# Patient Record
Sex: FEMALE | Race: WHITE | NOT HISPANIC OR LATINO | Employment: OTHER | URBAN - METROPOLITAN AREA
[De-identification: names, ages, dates, MRNs, and addresses within clinical notes are randomized per-mention and may not be internally consistent; named-entity substitution may affect disease eponyms.]

---

## 2024-06-02 ENCOUNTER — APPOINTMENT (OUTPATIENT)
Dept: RADIOLOGY | Facility: HOSPITAL | Age: 84
End: 2024-06-02
Payer: MEDICARE

## 2024-06-02 ENCOUNTER — HOSPITAL ENCOUNTER (EMERGENCY)
Facility: HOSPITAL | Age: 84
Discharge: HOME | End: 2024-06-02
Attending: EMERGENCY MEDICINE
Payer: MEDICARE

## 2024-06-02 VITALS
HEART RATE: 61 BPM | RESPIRATION RATE: 15 BRPM | SYSTOLIC BLOOD PRESSURE: 157 MMHG | TEMPERATURE: 98.1 F | WEIGHT: 200 LBS | DIASTOLIC BLOOD PRESSURE: 68 MMHG | OXYGEN SATURATION: 99 % | HEIGHT: 65 IN | BODY MASS INDEX: 33.32 KG/M2

## 2024-06-02 DIAGNOSIS — S09.90XA HEAD INJURY, INITIAL ENCOUNTER: Primary | ICD-10-CM

## 2024-06-02 PROCEDURE — 72125 CT NECK SPINE W/O DYE: CPT

## 2024-06-02 PROCEDURE — 70450 CT HEAD/BRAIN W/O DYE: CPT

## 2024-06-02 PROCEDURE — G0390 TRAUMA RESPONS W/HOSP CRITI: HCPCS

## 2024-06-02 PROCEDURE — 72125 CT NECK SPINE W/O DYE: CPT | Performed by: RADIOLOGY

## 2024-06-02 PROCEDURE — 70450 CT HEAD/BRAIN W/O DYE: CPT | Performed by: RADIOLOGY

## 2024-06-02 PROCEDURE — 99285 EMERGENCY DEPT VISIT HI MDM: CPT | Mod: 25

## 2024-06-02 ASSESSMENT — LIFESTYLE VARIABLES
HAVE PEOPLE ANNOYED YOU BY CRITICIZING YOUR DRINKING: NO
EVER FELT BAD OR GUILTY ABOUT YOUR DRINKING: NO
HAVE YOU EVER FELT YOU SHOULD CUT DOWN ON YOUR DRINKING: NO
EVER HAD A DRINK FIRST THING IN THE MORNING TO STEADY YOUR NERVES TO GET RID OF A HANGOVER: NO
TOTAL SCORE: 0

## 2024-06-02 ASSESSMENT — PAIN - FUNCTIONAL ASSESSMENT: PAIN_FUNCTIONAL_ASSESSMENT: 0-10

## 2024-06-02 NOTE — ED NOTES
Pt came into the ED tonight due to she hit her head on her freezer and she is on Eliquis.  She denies any LOC but she wanted to come in and get checked out to make sure she does not have any intra-cranial bleeding.  She states she does not have any pain at this time     Miguel Fitzgerald RN  06/02/24 5317

## 2024-06-02 NOTE — ED PROVIDER NOTES
HPI   No chief complaint on file.      84-year-old female with history of A-fib, cardiac stents, pacemaker on Eliquis presents to the ED after head injury.  She struck the back of her head on a freezer door.  No LOC.  No syncope.  Is complaining of head and neck pain.  No chest pain or shortness of breath.  No abdominal pain nausea or vomiting.  No weakness numbness or tingling in her extremities.  Otherwise feels fine.                          No data recorded                   Patient History   No past medical history on file.  No past surgical history on file.  No family history on file.  Social History     Tobacco Use    Smoking status: Not on file    Smokeless tobacco: Not on file   Substance Use Topics    Alcohol use: Not on file    Drug use: Not on file       Physical Exam   ED Triage Vitals   Temp Pulse Resp BP   -- -- -- --      SpO2 Temp src Heart Rate Source Patient Position   -- -- -- --      BP Location FiO2 (%)     -- --       Physical Exam  Vitals and nursing note reviewed.   Constitutional:       Appearance: Normal appearance.   HENT:      Head: Normocephalic and atraumatic.   Eyes:      Extraocular Movements: Extraocular movements intact.      Conjunctiva/sclera: Conjunctivae normal.      Pupils: Pupils are equal, round, and reactive to light.   Cardiovascular:      Rate and Rhythm: Normal rate and regular rhythm.   Pulmonary:      Effort: Pulmonary effort is normal.      Breath sounds: Normal breath sounds.   Abdominal:      General: Abdomen is flat.      Palpations: Abdomen is soft.      Tenderness: There is no abdominal tenderness.   Musculoskeletal:         General: Normal range of motion.      Cervical back: Normal range of motion and neck supple.   Skin:     General: Skin is warm and dry.      Capillary Refill: Capillary refill takes less than 2 seconds.   Neurological:      General: No focal deficit present.      Mental Status: She is alert and oriented to person, place, and time.       Cranial Nerves: No cranial nerve deficit.      Sensory: No sensory deficit.      Motor: No weakness.   Psychiatric:         Mood and Affect: Mood normal.         Behavior: Behavior normal.         Thought Content: Thought content normal.         Judgment: Judgment normal.         ED Course & MDM   Diagnoses as of 06/02/24 1920   Head injury, initial encounter       Medical Decision Making  84-year-old female presents to the ED after head injury.  She is on anticoagulation.  GCS of 15.  No LOC.  It does not appear to be a syncopal event.  Appears purely mechanical.  At this time of ordered CT head and CT C-spine.    Imaging negative for acute traumatic injury.  Discussed concussion symptoms with patient.  She was also notified of incidental findings of enlarged area of thyroid.  She is already aware and follows with endocrinology.  All the questions were answered.  Return precautions were given including worsening pain, weakness.        Procedure  Procedures     Robel Bailey MD  06/02/24 1921

## 2024-06-12 PROBLEM — E66.811 OBESITY, CLASS I, BMI 30-34.9: Status: ACTIVE | Noted: 2022-11-01

## 2024-06-12 PROBLEM — M25.551 RIGHT HIP PAIN: Status: ACTIVE | Noted: 2022-11-01

## 2024-06-12 PROBLEM — M70.50 PES ANSERINE BURSITIS: Status: ACTIVE | Noted: 2023-08-15

## 2024-06-12 PROBLEM — R26.81 GAIT INSTABILITY: Status: ACTIVE | Noted: 2022-12-27

## 2024-06-12 PROBLEM — D64.9 NORMOCYTIC ANEMIA: Status: ACTIVE | Noted: 2019-05-07

## 2024-06-12 PROBLEM — M19.90 ARTHRITIS: Status: ACTIVE | Noted: 2024-06-12

## 2024-06-12 PROBLEM — Z95.0 CARDIAC PACEMAKER IN SITU: Status: ACTIVE | Noted: 2024-06-12

## 2024-06-12 PROBLEM — E87.6 HYPOKALEMIA: Status: ACTIVE | Noted: 2022-12-27

## 2024-06-12 PROBLEM — M54.16 LUMBAR RADICULOPATHY: Status: ACTIVE | Noted: 2018-09-06

## 2024-06-12 PROBLEM — E66.9 OBESITY, CLASS I, BMI 30-34.9: Status: ACTIVE | Noted: 2022-11-01

## 2024-06-12 PROBLEM — I25.10 CORONARY ARTERY DISEASE INVOLVING NATIVE CORONARY ARTERY OF NATIVE HEART: Status: ACTIVE | Noted: 2019-12-12

## 2024-06-12 PROBLEM — M70.62 TROCHANTERIC BURSITIS OF BOTH HIPS: Status: ACTIVE | Noted: 2017-10-03

## 2024-06-12 PROBLEM — G89.29 CHRONIC PAIN OF LEFT KNEE: Status: ACTIVE | Noted: 2019-01-07

## 2024-06-12 PROBLEM — R10.9 ABDOMINAL PAIN: Status: ACTIVE | Noted: 2022-12-28

## 2024-06-12 PROBLEM — M25.562 CHRONIC PAIN OF LEFT KNEE: Status: ACTIVE | Noted: 2019-01-07

## 2024-06-12 PROBLEM — R07.2 PRECORDIAL CHEST PAIN: Status: ACTIVE | Noted: 2024-01-13

## 2024-06-12 PROBLEM — R07.89 OTHER CHEST PAIN: Status: ACTIVE | Noted: 2021-06-09

## 2024-06-12 PROBLEM — K80.50 BILIARY COLIC: Status: ACTIVE | Noted: 2020-12-11

## 2024-06-12 PROBLEM — E87.1 HYPONATREMIA: Status: ACTIVE | Noted: 2019-05-07

## 2024-06-12 PROBLEM — K44.9 DIAPHRAGMATIC HERNIA: Status: ACTIVE | Noted: 2024-06-12

## 2024-06-12 PROBLEM — K92.1 BLOOD IN STOOL: Status: RESOLVED | Noted: 2022-12-27 | Resolved: 2024-06-12

## 2024-06-12 PROBLEM — R91.8 LUNG NODULES: Status: ACTIVE | Noted: 2018-01-31

## 2024-06-12 PROBLEM — K80.20 CALCULUS OF GALLBLADDER WITHOUT CHOLECYSTITIS WITHOUT OBSTRUCTION: Status: ACTIVE | Noted: 2023-08-10

## 2024-06-12 PROBLEM — R09.82 POST-NASAL DISCHARGE: Status: ACTIVE | Noted: 2023-08-10

## 2024-06-12 PROBLEM — I48.0 PAROXYSMAL ATRIAL FIBRILLATION (MULTI): Status: ACTIVE | Noted: 2023-08-10

## 2024-06-12 PROBLEM — E78.2 MIXED HYPERLIPIDEMIA: Status: ACTIVE | Noted: 2023-08-10

## 2024-06-12 PROBLEM — S80.12XA TRAUMATIC HEMATOMA OF LEFT LOWER LEG: Status: ACTIVE | Noted: 2021-09-15

## 2024-06-12 PROBLEM — M70.61 TROCHANTERIC BURSITIS OF BOTH HIPS: Status: ACTIVE | Noted: 2017-10-03

## 2024-06-12 PROBLEM — M48.061 SPINAL STENOSIS, LUMBAR REGION, WITHOUT NEUROGENIC CLAUDICATION: Status: ACTIVE | Noted: 2018-09-06

## 2024-06-12 PROBLEM — K57.92 DIVERTICULITIS: Status: RESOLVED | Noted: 2022-12-27 | Resolved: 2024-06-12

## 2024-06-14 ENCOUNTER — LAB (OUTPATIENT)
Dept: LAB | Facility: LAB | Age: 84
End: 2024-06-14
Payer: MEDICARE

## 2024-06-14 ENCOUNTER — APPOINTMENT (OUTPATIENT)
Dept: PRIMARY CARE | Facility: CLINIC | Age: 84
End: 2024-06-14
Payer: MEDICARE

## 2024-06-14 VITALS
DIASTOLIC BLOOD PRESSURE: 78 MMHG | HEART RATE: 53 BPM | BODY MASS INDEX: 34.49 KG/M2 | WEIGHT: 207 LBS | OXYGEN SATURATION: 98 % | SYSTOLIC BLOOD PRESSURE: 146 MMHG | HEIGHT: 65 IN

## 2024-06-14 DIAGNOSIS — Z00.00 ROUTINE ADULT HEALTH MAINTENANCE: ICD-10-CM

## 2024-06-14 DIAGNOSIS — R73.9 HYPERGLYCEMIA: ICD-10-CM

## 2024-06-14 DIAGNOSIS — I25.10 CORONARY ARTERY DISEASE INVOLVING NATIVE CORONARY ARTERY OF NATIVE HEART WITHOUT ANGINA PECTORIS: ICD-10-CM

## 2024-06-14 DIAGNOSIS — Z85.828 HISTORY OF SKIN CANCER: ICD-10-CM

## 2024-06-14 DIAGNOSIS — R93.0 ABNORMAL CT SCAN OF HEAD: ICD-10-CM

## 2024-06-14 DIAGNOSIS — M15.9 PRIMARY OSTEOARTHRITIS INVOLVING MULTIPLE JOINTS: ICD-10-CM

## 2024-06-14 DIAGNOSIS — E04.9 ENLARGED THYROID: ICD-10-CM

## 2024-06-14 DIAGNOSIS — E55.9 VITAMIN D DEFICIENCY: ICD-10-CM

## 2024-06-14 DIAGNOSIS — I48.0 PAROXYSMAL ATRIAL FIBRILLATION (MULTI): ICD-10-CM

## 2024-06-14 DIAGNOSIS — I10 BENIGN ESSENTIAL HYPERTENSION: ICD-10-CM

## 2024-06-14 DIAGNOSIS — Z00.00 ROUTINE GENERAL MEDICAL EXAMINATION AT HEALTH CARE FACILITY: Primary | ICD-10-CM

## 2024-06-14 PROBLEM — M15.0 PRIMARY OSTEOARTHRITIS INVOLVING MULTIPLE JOINTS: Status: ACTIVE | Noted: 2024-06-14

## 2024-06-14 LAB
25(OH)D3 SERPL-MCNC: 33 NG/ML (ref 30–100)
ALBUMIN SERPL BCP-MCNC: 4.1 G/DL (ref 3.4–5)
ALP SERPL-CCNC: 107 U/L (ref 33–136)
ALT SERPL W P-5'-P-CCNC: 31 U/L (ref 7–45)
ANION GAP SERPL CALC-SCNC: 15 MMOL/L (ref 10–20)
AST SERPL W P-5'-P-CCNC: 24 U/L (ref 9–39)
BASOPHILS # BLD AUTO: 0.04 X10*3/UL (ref 0–0.1)
BASOPHILS NFR BLD AUTO: 0.4 %
BILIRUB SERPL-MCNC: 0.8 MG/DL (ref 0–1.2)
BUN SERPL-MCNC: 15 MG/DL (ref 6–23)
CALCIUM SERPL-MCNC: 9.4 MG/DL (ref 8.6–10.3)
CHLORIDE SERPL-SCNC: 95 MMOL/L (ref 98–107)
CO2 SERPL-SCNC: 27 MMOL/L (ref 21–32)
CREAT SERPL-MCNC: 0.76 MG/DL (ref 0.5–1.05)
EGFRCR SERPLBLD CKD-EPI 2021: 78 ML/MIN/1.73M*2
EOSINOPHIL # BLD AUTO: 0.06 X10*3/UL (ref 0–0.4)
EOSINOPHIL NFR BLD AUTO: 0.6 %
ERYTHROCYTE [DISTWIDTH] IN BLOOD BY AUTOMATED COUNT: 14.6 % (ref 11.5–14.5)
EST. AVERAGE GLUCOSE BLD GHB EST-MCNC: 117 MG/DL
GLUCOSE SERPL-MCNC: 85 MG/DL (ref 74–99)
HBA1C MFR BLD: 5.7 %
HCT VFR BLD AUTO: 41 % (ref 36–46)
HGB BLD-MCNC: 13.6 G/DL (ref 12–16)
IMM GRANULOCYTES # BLD AUTO: 0.05 X10*3/UL (ref 0–0.5)
IMM GRANULOCYTES NFR BLD AUTO: 0.5 % (ref 0–0.9)
LYMPHOCYTES # BLD AUTO: 1.04 X10*3/UL (ref 0.8–3)
LYMPHOCYTES NFR BLD AUTO: 11.1 %
MCH RBC QN AUTO: 31.2 PG (ref 26–34)
MCHC RBC AUTO-ENTMCNC: 33.2 G/DL (ref 32–36)
MCV RBC AUTO: 94 FL (ref 80–100)
MONOCYTES # BLD AUTO: 1.15 X10*3/UL (ref 0.05–0.8)
MONOCYTES NFR BLD AUTO: 12.2 %
NEUTROPHILS # BLD AUTO: 7.06 X10*3/UL (ref 1.6–5.5)
NEUTROPHILS NFR BLD AUTO: 75.2 %
NRBC BLD-RTO: 0 /100 WBCS (ref 0–0)
PLATELET # BLD AUTO: 240 X10*3/UL (ref 150–450)
POTASSIUM SERPL-SCNC: 4.3 MMOL/L (ref 3.5–5.3)
PROT SERPL-MCNC: 6.6 G/DL (ref 6.4–8.2)
RBC # BLD AUTO: 4.36 X10*6/UL (ref 4–5.2)
SODIUM SERPL-SCNC: 133 MMOL/L (ref 136–145)
WBC # BLD AUTO: 9.4 X10*3/UL (ref 4.4–11.3)

## 2024-06-14 PROCEDURE — 1036F TOBACCO NON-USER: CPT | Performed by: NURSE PRACTITIONER

## 2024-06-14 PROCEDURE — 1123F ACP DISCUSS/DSCN MKR DOCD: CPT | Performed by: NURSE PRACTITIONER

## 2024-06-14 PROCEDURE — 1159F MED LIST DOCD IN RCRD: CPT | Performed by: NURSE PRACTITIONER

## 2024-06-14 PROCEDURE — 80053 COMPREHEN METABOLIC PANEL: CPT

## 2024-06-14 PROCEDURE — 99214 OFFICE O/P EST MOD 30 MIN: CPT | Performed by: NURSE PRACTITIONER

## 2024-06-14 PROCEDURE — 83036 HEMOGLOBIN GLYCOSYLATED A1C: CPT

## 2024-06-14 PROCEDURE — 1160F RVW MEDS BY RX/DR IN RCRD: CPT | Performed by: NURSE PRACTITIONER

## 2024-06-14 PROCEDURE — G0439 PPPS, SUBSEQ VISIT: HCPCS | Performed by: NURSE PRACTITIONER

## 2024-06-14 PROCEDURE — 36415 COLL VENOUS BLD VENIPUNCTURE: CPT

## 2024-06-14 PROCEDURE — 3077F SYST BP >= 140 MM HG: CPT | Performed by: NURSE PRACTITIONER

## 2024-06-14 PROCEDURE — 1158F ADVNC CARE PLAN TLK DOCD: CPT | Performed by: NURSE PRACTITIONER

## 2024-06-14 PROCEDURE — 82306 VITAMIN D 25 HYDROXY: CPT

## 2024-06-14 PROCEDURE — 3078F DIAST BP <80 MM HG: CPT | Performed by: NURSE PRACTITIONER

## 2024-06-14 PROCEDURE — 1170F FXNL STATUS ASSESSED: CPT | Performed by: NURSE PRACTITIONER

## 2024-06-14 PROCEDURE — 85025 COMPLETE CBC W/AUTO DIFF WBC: CPT

## 2024-06-14 RX ORDER — METHIMAZOLE 5 MG/1
5 TABLET ORAL
COMMUNITY
Start: 2024-04-09

## 2024-06-14 RX ORDER — AMLODIPINE BESYLATE 5 MG/1
5 TABLET ORAL
COMMUNITY
Start: 2024-04-04

## 2024-06-14 RX ORDER — INDAPAMIDE 2.5 MG/1
2.5 TABLET ORAL
COMMUNITY
Start: 2024-04-04

## 2024-06-14 RX ORDER — NYSTATIN 100000 [USP'U]/G
1 POWDER TOPICAL 2 TIMES DAILY
Qty: 60 G | Refills: 2 | Status: SHIPPED | OUTPATIENT
Start: 2024-06-14 | End: 2025-06-14

## 2024-06-14 RX ORDER — IRBESARTAN 300 MG/1
150 TABLET ORAL 2 TIMES DAILY
COMMUNITY
Start: 2024-05-30

## 2024-06-14 RX ORDER — APIXABAN 5 MG/1
5 TABLET, FILM COATED ORAL
COMMUNITY
Start: 2024-04-04

## 2024-06-14 RX ORDER — LORAZEPAM 0.5 MG/1
0.5 TABLET ORAL NIGHTLY
COMMUNITY
Start: 2024-05-21

## 2024-06-14 RX ORDER — METOPROLOL TARTRATE 50 MG/1
1 TABLET ORAL
COMMUNITY
Start: 2024-04-04

## 2024-06-14 RX ORDER — NYSTATIN 100000 U/G
CREAM TOPICAL AS NEEDED
COMMUNITY
Start: 2024-04-24 | End: 2024-06-14 | Stop reason: ALTCHOICE

## 2024-06-14 RX ORDER — ACETAMINOPHEN 325 MG/1
650 TABLET ORAL EVERY 6 HOURS PRN
COMMUNITY

## 2024-06-14 RX ORDER — METHOCARBAMOL 500 MG/1
1 TABLET, FILM COATED ORAL
COMMUNITY
Start: 2024-04-25

## 2024-06-14 RX ORDER — ROSUVASTATIN CALCIUM 20 MG/1
20 TABLET, COATED ORAL NIGHTLY
COMMUNITY
Start: 2024-05-02

## 2024-06-14 RX ORDER — NYSTATIN 100000 U/G
CREAM TOPICAL AS NEEDED
Qty: 60 G | Refills: 2 | Status: SHIPPED | OUTPATIENT
Start: 2024-06-14

## 2024-06-14 RX ORDER — GUAIFENESIN 100 MG/5ML
200 SOLUTION ORAL 3 TIMES DAILY PRN
COMMUNITY

## 2024-06-14 RX ORDER — AMIODARONE HYDROCHLORIDE 200 MG/1
1 TABLET ORAL
COMMUNITY
Start: 2024-05-02

## 2024-06-14 ASSESSMENT — ENCOUNTER SYMPTOMS
DIZZINESS: 0
FATIGUE: 0
OCCASIONAL FEELINGS OF UNSTEADINESS: 0
ABDOMINAL DISTENTION: 0
LOSS OF SENSATION IN FEET: 0
DIARRHEA: 0
WEAKNESS: 0
WHEEZING: 0
MYALGIAS: 0
COLOR CHANGE: 0
COUGH: 1
EYE PAIN: 0
BACK PAIN: 1
BRUISES/BLEEDS EASILY: 0
ABDOMINAL PAIN: 0
TROUBLE SWALLOWING: 0
HEADACHES: 0
PALPITATIONS: 0
JOINT SWELLING: 0
WOUND: 0
CONSTIPATION: 0
DEPRESSION: 0
NAUSEA: 0
ARTHRALGIAS: 1
SEIZURES: 0
SHORTNESS OF BREATH: 0
DIFFICULTY URINATING: 0
ADENOPATHY: 0
CHILLS: 0
FEVER: 0

## 2024-06-14 ASSESSMENT — ACTIVITIES OF DAILY LIVING (ADL)
DOING_HOUSEWORK: INDEPENDENT
BATHING: INDEPENDENT
DRESSING: INDEPENDENT
MANAGING_FINANCES: INDEPENDENT
TAKING_MEDICATION: INDEPENDENT
GROCERY_SHOPPING: INDEPENDENT

## 2024-06-14 ASSESSMENT — PATIENT HEALTH QUESTIONNAIRE - PHQ9
SUM OF ALL RESPONSES TO PHQ9 QUESTIONS 1 AND 2: 0
2. FEELING DOWN, DEPRESSED OR HOPELESS: NOT AT ALL
1. LITTLE INTEREST OR PLEASURE IN DOING THINGS: NOT AT ALL

## 2024-06-14 ASSESSMENT — COLUMBIA-SUICIDE SEVERITY RATING SCALE - C-SSRS
6. HAVE YOU EVER DONE ANYTHING, STARTED TO DO ANYTHING, OR PREPARED TO DO ANYTHING TO END YOUR LIFE?: NO
2. HAVE YOU ACTUALLY HAD ANY THOUGHTS OF KILLING YOURSELF?: NO
1. IN THE PAST MONTH, HAVE YOU WISHED YOU WERE DEAD OR WISHED YOU COULD GO TO SLEEP AND NOT WAKE UP?: NO

## 2024-06-14 NOTE — ASSESSMENT & PLAN NOTE
Rate controlled with metoprolol and amiodarone.  Patient continues on Eliquis 5 mg twice daily.  She is to notify provider for any persistent issues with excessive bruising or bleeding.

## 2024-06-14 NOTE — ASSESSMENT & PLAN NOTE
"Incidental findings on recent CT scan showed: \"1.4 cm hypodense lesion within the greater wing of the left sphenoid; appearance is indeterminate, though suggestive of hemangioma. If further characterization is desired, follow-up MRI  with contrast may be considered\". Patient unable to complete an MRI due to PPM  "

## 2024-06-14 NOTE — ASSESSMENT & PLAN NOTE
Patient to continue routine follow-up with orthopedics for pain management of bilateral hips and lower back.  Physical therapy declined at this time.

## 2024-06-14 NOTE — ASSESSMENT & PLAN NOTE
Routine labs ordered today for monitoring purposes  -Patient follows routinely with gastroenterology for colonoscopies given history of colon cancer.  -Due to patient's age, no routine mammogram or pelvic exam indicated at this time

## 2024-06-14 NOTE — ASSESSMENT & PLAN NOTE
Slight elevation in blood pressure noted today.  Patient is asymptomatic.  She is to continue to monitor her blood pressure at home and notify provider for any persistent issues with hyper or hypotension.  She is also to notify provider for any new cardiac concerns.  Patient to maintain routine follow-up with cardiology.

## 2024-06-14 NOTE — ASSESSMENT & PLAN NOTE
"Incidental finding on recent CT scan shows \"Heterogeneous enlargement of the inferior thyroid isthmus. Consider follow-up thyroid ultrasound\". US ordered for additional assessment purposes. Patient also follows routinely with Endocrinology; appointment scheduled towards the end of the yeat    "

## 2024-06-14 NOTE — PROGRESS NOTES
"Subjective   Patient ID: Danisha Marsh is a 84 y.o. female who presents for Establish Care and Medicare Annual Wellness Visit Subsequent.    Patient seen today in order to establish primary care as well as complete an annual Medicare wellness exam.  Patient seen sitting up in office, in no acute distress.  She is alert, oriented and appears in good spirits.  Patient recently moved to [place] from [place] in order to be closer to her family.  She is a  but denies any current issues with mood or insomnia.  Patient states her mood is \"pretty good \".  Patient recently presented to the emergency department after hitting her head on the freezer door.  Patient is on blood thinners and imaging was negative for acute bleeding concerns.  2 incidental findings noted on imaging and were discussed with patient today.  She is agreeable for an ultrasound of the thyroid given some enlargement.  She also follows routinely with endocrinology for chronic thyroid concerns.  Patient is unable to have an MRI of her brain given presence of permanent pacemaker.  She ambulates with a cane outside of her apartment and denies any recent falls or issues with weakness.  No shortness or chest pain reported on exertion.  Patient admits to chronic pain issues in both hips and lower back.  She has a past medical history of spinal surgery and has no desire to undergo any additional surgical interventions at this time.  She follows up with orthopedics for additional treatments and just received steroid injections into her hips.  Patient is declining the need for physical therapy services at this time.  She is hoping to join some water aerobics in the near future.   Patient is undergoing cataract surgery next month.  She is looking for primary care and cardiology clearance.  Patient monitors her vital signs routinely at home and states it was 122/71 this morning.  She denies any symptoms associated with elevated blood pressure.  Patient " denies chest pain, shortness of breath, headaches, blurred vision or bilateral lower extremity edema.  Chronic cough reported but patient states this is secondary to postnasal drip. Zytrec helps to control her symptoms. Patient follows routinely with gastroenterology due to history of diverticulosis and colon cancer.  She reports no issues currently with bowel or bladder.  No reported issues with appetite or staying hydrated.  Patient reports 2 small skin lesions, 1 on her left shin and 1 on her right forearm.  She is requesting dermatology referral due to history of skin cancer.  Medications reviewed.  No other acute concerns voiced at this time.        Current Outpatient Medications on File Prior to Visit   Medication Sig Dispense Refill    acetaminophen (Tylenol) 325 mg tablet Take 2 tablets (650 mg) by mouth every 6 hours if needed for moderate pain (4 - 6).      amiodarone (Pacerone) 200 mg tablet Take 1 tablet (200 mg) by mouth early in the morning..      amLODIPine (Norvasc) 5 mg tablet Take 1 tablet (5 mg) by mouth every 12 hours.      Eliquis 5 mg tablet Take 1 tablet (5 mg) by mouth every 12 hours.      guaiFENesin (Robitussin) 100 mg/5 mL syrup Take 10 mL (200 mg) by mouth 3 times a day as needed for cough.      indapamide (Lozol) 2.5 mg tablet Take 1 tablet (2.5 mg) by mouth every other day.      irbesartan (Avapro) 300 mg tablet Take 0.5 tablets (150 mg) by mouth 2 times a day.      LORazepam (Ativan) 0.5 mg tablet Take 1 tablet (0.5 mg) by mouth once daily at bedtime.      methIMAzole (Tapazole) 5 mg tablet Take 1 tablet (5 mg) by mouth. 1 tab daily then 1 tab in the evening every other day      methocarbamol (Robaxin) 500 mg tablet Take 1 tablet (500 mg) by mouth 3 times a day.      metoprolol tartrate (Lopressor) 50 mg tablet Take 1 tablet by mouth every 12 hours.      rosuvastatin (Crestor) 20 mg tablet Take 1 tablet (20 mg) by mouth once daily at bedtime.      [DISCONTINUED] nystatin (Mycostatin)  cream Apply topically if needed.       No current facility-administered medications on file prior to visit.       Past Medical History:   Diagnosis Date    Allergic     Arthritis     Blood in stool 12/27/2022    Cancer (Multi) 2007    Cataract 2024    Disease of thyroid gland     Diverticulitis 12/27/2022    Eczema     Heart disease stent 2018    Hypertension     Inflammatory bowel disease     Urinary tract infection     Varicella     Visual impairment         Past Surgical History:   Procedure Laterality Date    BACK SURGERY  1973    CARDIAC CATHETERIZATION      CARDIAC SURGERY  ablasion & pace maker for afib    COLON SURGERY  2007    CORONARY STENT PLACEMENT  2018    HERNIA REPAIR  2007    JOINT REPLACEMENT  2019        Family History   Problem Relation Name Age of Onset    Cancer Mother Nereyda Yen     Heart disease Father Juanito Yen         Review of Systems   Constitutional:  Negative for chills, fatigue and fever.   HENT:  Positive for postnasal drip. Negative for dental problem and trouble swallowing.    Eyes:  Negative for pain and visual disturbance.        Wears glasses   Respiratory:  Positive for cough. Negative for shortness of breath (health care main) and wheezing.         Positive for chronic cough secondary to PND   Cardiovascular:  Negative for chest pain, palpitations and leg swelling.   Gastrointestinal:  Negative for abdominal distention, abdominal pain, constipation, diarrhea and nausea.        Positive for colon cancer history   Endocrine: Negative for cold intolerance and heat intolerance.   Genitourinary:  Negative for difficulty urinating.   Musculoskeletal:  Positive for arthralgias, back pain and gait problem. Negative for joint swelling and myalgias.        Positive for chronic bilateral hip and lower back pain.    Skin:  Negative for color change, pallor, rash and wound.        Positive skin cancer history. See HPI   Allergic/Immunologic: Negative for environmental allergies and food  "allergies.   Neurological:  Negative for dizziness, seizures, weakness and headaches.   Hematological:  Negative for adenopathy. Does not bruise/bleed easily.   Psychiatric/Behavioral:  Negative for behavioral problems.    All other systems reviewed and are negative.      Objective   /78   Pulse 53   Ht 1.651 m (5' 5\")   Wt 93.9 kg (207 lb)   SpO2 98%   BMI 34.45 kg/m²     Physical Exam  Constitutional:       General: She is not in acute distress.     Appearance: Normal appearance. She is not toxic-appearing.   HENT:      Head: Normocephalic and atraumatic.      Right Ear: Tympanic membrane, ear canal and external ear normal.      Left Ear: Tympanic membrane, ear canal and external ear normal.      Nose: Nose normal.      Mouth/Throat:      Mouth: Mucous membranes are moist.      Pharynx: Oropharynx is clear.      Comments: Missing just a few teeth.  Overall fair dentition  Eyes:      Extraocular Movements: Extraocular movements intact.      Conjunctiva/sclera: Conjunctivae normal.      Pupils: Pupils are equal, round, and reactive to light.   Cardiovascular:      Rate and Rhythm: Normal rate and regular rhythm.      Pulses: Normal pulses.      Heart sounds: Murmur heard.   Pulmonary:      Effort: Pulmonary effort is normal.      Breath sounds: Normal breath sounds. No wheezing.   Abdominal:      General: Bowel sounds are normal.      Palpations: Abdomen is soft.   Musculoskeletal:         General: No swelling.      Cervical back: Normal range of motion and neck supple.      Comments: Ambulates with a cane   Skin:     General: Skin is warm and dry.      Comments: Venous staining to BLE. Varicose veins to BLE. Small skin lesion to left lower calf and right forearm; scaly and raised.  No drainage, warmth or surrounding erythema.   Neurological:      General: No focal deficit present.      Mental Status: She is alert and oriented to person, place, and time. Mental status is at baseline.      Cranial Nerves: " No cranial nerve deficit.      Motor: No weakness.   Psychiatric:         Mood and Affect: Mood normal.         Behavior: Behavior normal.         Thought Content: Thought content normal.         Judgment: Judgment normal.         Assessment/Plan   Problem List Items Addressed This Visit             ICD-10-CM    Paroxysmal atrial fibrillation (Multi) I48.0     Rate controlled with metoprolol and amiodarone.  Patient continues on Eliquis 5 mg twice daily.  She is to notify provider for any persistent issues with excessive bruising or bleeding.         Relevant Medications    amLODIPine (Norvasc) 5 mg tablet    metoprolol tartrate (Lopressor) 50 mg tablet    Coronary artery disease involving native coronary artery of native heart I25.10     Patient to maintain routine follow-up with cardiology.  She continues on Crestor         Relevant Medications    amLODIPine (Norvasc) 5 mg tablet    metoprolol tartrate (Lopressor) 50 mg tablet    Benign essential hypertension I10     Slight elevation in blood pressure noted today.  Patient is asymptomatic.  She is to continue to monitor her blood pressure at home and notify provider for any persistent issues with hyper or hypotension.  She is also to notify provider for any new cardiac concerns.  Patient to maintain routine follow-up with cardiology.         Relevant Orders    CBC and Auto Differential    Routine adult health maintenance Z00.00     Routine labs ordered today for monitoring purposes  -Patient follows routinely with gastroenterology for colonoscopies given history of colon cancer.  -Due to patient's age, no routine mammogram or pelvic exam indicated at this time         Relevant Medications    nystatin (Mycostatin) 100,000 unit/gram powder    nystatin (Mycostatin) cream    Other Relevant Orders    CBC and Auto Differential    Comprehensive Metabolic Panel    Hemoglobin A1C    Vitamin D 25-Hydroxy,Total (for eval of Vitamin D levels)    Enlarged thyroid E04.9      "Incidental finding on recent CT scan shows \"Heterogeneous enlargement of the inferior thyroid isthmus. Consider follow-up thyroid ultrasound\". US ordered for additional assessment purposes. Patient also follows routinely with Endocrinology; appointment scheduled towards the end of the yeat           Relevant Orders    US thyroid    Abnormal CT scan of head R93.0     Incidental findings on recent CT scan showed: \"1.4 cm hypodense lesion within the greater wing of the left sphenoid; appearance is indeterminate, though suggestive of hemangioma. If further characterization is desired, follow-up MRI  with contrast may be considered\". Patient unable to complete an MRI due to PPM         Primary osteoarthritis involving multiple joints M15.9     Patient to continue routine follow-up with orthopedics for pain management of bilateral hips and lower back.  Physical therapy declined at this time.         History of skin cancer Z85.828     Dermatology referral placed given new skin lesions and history of skin cancer         Relevant Orders    Referral to Dermatology     Other Visit Diagnoses         Codes    Routine general medical examination at health care facility    -  Primary Z00.00    Vitamin D deficiency     E55.9    Relevant Orders    Vitamin D 25-Hydroxy,Total (for eval of Vitamin D levels)    Hyperglycemia     R73.9    Relevant Orders    Hemoglobin A1C               "

## 2024-06-14 NOTE — PATIENT INSTRUCTIONS
Monitor your blood pressure at least once daily. Keep a log for provider review.  Please call the office before than if the systolic blood pressure (top number) is consistently greater than 150 or the diastolic blood pressure (bottom number) is consistently greater than 90.  Call 911 or emergency medical services if your blood pressure is 180/120 mm Hg or greater and you have chest pain, shortness of breath, or symptoms of stroke. Stroke symptoms include numbness or tingling, trouble speaking, or changes in vision    Please arrange 6 month follow-up

## 2024-06-28 ENCOUNTER — TELEPHONE (OUTPATIENT)
Dept: PRIMARY CARE | Facility: CLINIC | Age: 84
End: 2024-06-28
Payer: MEDICARE

## 2024-06-28 DIAGNOSIS — I10 BENIGN ESSENTIAL HYPERTENSION: ICD-10-CM

## 2024-06-28 RX ORDER — METOPROLOL TARTRATE 50 MG/1
50 TABLET ORAL
Qty: 180 TABLET | Refills: 1 | Status: SHIPPED | OUTPATIENT
Start: 2024-06-28

## 2024-06-28 RX ORDER — METHIMAZOLE 5 MG/1
TABLET ORAL
Qty: 154 TABLET | Refills: 1 | Status: SHIPPED | OUTPATIENT
Start: 2024-06-28

## 2024-06-28 NOTE — TELEPHONE ENCOUNTER
Rx Refill Request Telephone Encounter    Name:  Danisha Marsh  :  [id number]  Medication Name:      metoprolol tartrate (Lopressor) 50 mg tablet Take 1 tablet by mouth every 12 hours. - 90 day    methIMAzole (Tapazole) 5 mg tablet Take 1 tablet (5 mg) by mouth. 1 tab daily then 1 tab in the evening every other day - 90 day     Specific Pharmacy location:  [facility]  Date of last appointment:  2024  Date of next appointment:  na  Best number to reach patient:  [phone]

## 2024-07-08 ENCOUNTER — HOSPITAL ENCOUNTER (OUTPATIENT)
Dept: RADIOLOGY | Facility: HOSPITAL | Age: 84
Discharge: HOME | End: 2024-07-08
Payer: MEDICARE

## 2024-07-08 DIAGNOSIS — E04.9 ENLARGED THYROID: ICD-10-CM

## 2024-07-08 PROCEDURE — 76536 US EXAM OF HEAD AND NECK: CPT | Performed by: RADIOLOGY

## 2024-07-08 PROCEDURE — 76536 US EXAM OF HEAD AND NECK: CPT

## 2024-07-10 ENCOUNTER — APPOINTMENT (OUTPATIENT)
Dept: RADIOLOGY | Facility: HOSPITAL | Age: 84
End: 2024-07-10
Payer: MEDICARE

## 2024-07-10 ENCOUNTER — HOSPITAL ENCOUNTER (OUTPATIENT)
Dept: CARDIOLOGY | Facility: HOSPITAL | Age: 84
Discharge: HOME | End: 2024-07-10
Payer: MEDICARE

## 2024-07-10 ENCOUNTER — HOSPITAL ENCOUNTER (EMERGENCY)
Facility: HOSPITAL | Age: 84
Discharge: HOME | End: 2024-07-10
Attending: EMERGENCY MEDICINE
Payer: MEDICARE

## 2024-07-10 VITALS
DIASTOLIC BLOOD PRESSURE: 89 MMHG | TEMPERATURE: 97 F | OXYGEN SATURATION: 100 % | HEART RATE: 70 BPM | WEIGHT: 207 LBS | HEIGHT: 66 IN | RESPIRATION RATE: 17 BRPM | BODY MASS INDEX: 33.27 KG/M2 | SYSTOLIC BLOOD PRESSURE: 128 MMHG
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
## 2024-07-10 DIAGNOSIS — S51.011A SKIN TEAR OF RIGHT ELBOW WITHOUT COMPLICATION, INITIAL ENCOUNTER: ICD-10-CM

## 2024-07-10 DIAGNOSIS — D68.9 COAGULOPATHY (MULTI): ICD-10-CM

## 2024-07-10 DIAGNOSIS — S80.02XA CONTUSION OF LEFT KNEE, INITIAL ENCOUNTER: ICD-10-CM

## 2024-07-10 DIAGNOSIS — S50.01XA CONTUSION OF RIGHT ELBOW, INITIAL ENCOUNTER: ICD-10-CM

## 2024-07-10 LAB
ALBUMIN SERPL BCP-MCNC: 3.9 G/DL (ref 3.4–5)
ALP SERPL-CCNC: 96 U/L (ref 33–136)
ALT SERPL W P-5'-P-CCNC: 27 U/L (ref 7–45)
ANION GAP SERPL CALC-SCNC: 18 MMOL/L
AST SERPL W P-5'-P-CCNC: 26 U/L (ref 9–39)
BASOPHILS # BLD AUTO: 0.03 X10*3/UL (ref 0–0.1)
BASOPHILS NFR BLD AUTO: 0.3 %
BILIRUB SERPL-MCNC: 0.7 MG/DL (ref 0–1.2)
BUN SERPL-MCNC: 14 MG/DL (ref 6–23)
CALCIUM SERPL-MCNC: 9.1 MG/DL (ref 8.6–10.3)
CHLORIDE SERPL-SCNC: 94 MMOL/L (ref 98–107)
CO2 SERPL-SCNC: 23 MMOL/L (ref 21–32)
CREAT SERPL-MCNC: 0.83 MG/DL (ref 0.5–1.05)
EGFRCR SERPLBLD CKD-EPI 2021: 70 ML/MIN/1.73M*2
EOSINOPHIL # BLD AUTO: 0.07 X10*3/UL (ref 0–0.4)
EOSINOPHIL NFR BLD AUTO: 0.7 %
ERYTHROCYTE [DISTWIDTH] IN BLOOD BY AUTOMATED COUNT: 14.3 % (ref 11.5–14.5)
ETHANOL SERPL-MCNC: <10 MG/DL
GLUCOSE SERPL-MCNC: 122 MG/DL (ref 74–99)
HCT VFR BLD AUTO: 39.8 % (ref 36–46)
HGB BLD-MCNC: 13.4 G/DL (ref 12–16)
IMM GRANULOCYTES # BLD AUTO: 0.02 X10*3/UL (ref 0–0.5)
IMM GRANULOCYTES NFR BLD AUTO: 0.2 % (ref 0–0.9)
INR PPP: 1.2 (ref 0.9–1.1)
LACTATE SERPL-SCNC: 2.1 MMOL/L (ref 0.4–2)
LYMPHOCYTES # BLD AUTO: 1.53 X10*3/UL (ref 0.8–3)
LYMPHOCYTES NFR BLD AUTO: 16.3 %
MCH RBC QN AUTO: 30.2 PG (ref 26–34)
MCHC RBC AUTO-ENTMCNC: 33.7 G/DL (ref 32–36)
MCV RBC AUTO: 90 FL (ref 80–100)
MONOCYTES # BLD AUTO: 0.98 X10*3/UL (ref 0.05–0.8)
MONOCYTES NFR BLD AUTO: 10.4 %
NEUTROPHILS # BLD AUTO: 6.76 X10*3/UL (ref 1.6–5.5)
NEUTROPHILS NFR BLD AUTO: 72.1 %
NRBC BLD-RTO: 0 /100 WBCS (ref 0–0)
PLATELET # BLD AUTO: 193 X10*3/UL (ref 150–450)
POTASSIUM SERPL-SCNC: 3.7 MMOL/L (ref 3.5–5.3)
PROT SERPL-MCNC: 6.8 G/DL (ref 6.4–8.2)
PROTHROMBIN TIME: 13.4 SECONDS (ref 9.8–12.8)
RBC # BLD AUTO: 4.43 X10*6/UL (ref 4–5.2)
SODIUM SERPL-SCNC: 131 MMOL/L (ref 136–145)
WBC # BLD AUTO: 9.4 X10*3/UL (ref 4.4–11.3)

## 2024-07-10 PROCEDURE — 36415 COLL VENOUS BLD VENIPUNCTURE: CPT | Performed by: EMERGENCY MEDICINE

## 2024-07-10 PROCEDURE — 73080 X-RAY EXAM OF ELBOW: CPT | Mod: RIGHT SIDE | Performed by: RADIOLOGY

## 2024-07-10 PROCEDURE — 85610 PROTHROMBIN TIME: CPT | Performed by: EMERGENCY MEDICINE

## 2024-07-10 PROCEDURE — 73080 X-RAY EXAM OF ELBOW: CPT | Mod: RT

## 2024-07-10 PROCEDURE — 72170 X-RAY EXAM OF PELVIS: CPT

## 2024-07-10 PROCEDURE — 73564 X-RAY EXAM KNEE 4 OR MORE: CPT | Mod: LEFT SIDE | Performed by: RADIOLOGY

## 2024-07-10 PROCEDURE — G0390 TRAUMA RESPONS W/HOSP CRITI: HCPCS

## 2024-07-10 PROCEDURE — 71250 CT THORAX DX C-: CPT

## 2024-07-10 PROCEDURE — 70450 CT HEAD/BRAIN W/O DYE: CPT | Performed by: RADIOLOGY

## 2024-07-10 PROCEDURE — 83605 ASSAY OF LACTIC ACID: CPT | Performed by: EMERGENCY MEDICINE

## 2024-07-10 PROCEDURE — 73564 X-RAY EXAM KNEE 4 OR MORE: CPT | Mod: LT

## 2024-07-10 PROCEDURE — 80053 COMPREHEN METABOLIC PANEL: CPT | Performed by: EMERGENCY MEDICINE

## 2024-07-10 PROCEDURE — 72125 CT NECK SPINE W/O DYE: CPT | Performed by: RADIOLOGY

## 2024-07-10 PROCEDURE — 73590 X-RAY EXAM OF LOWER LEG: CPT | Mod: RT

## 2024-07-10 PROCEDURE — 93005 ELECTROCARDIOGRAM TRACING: CPT

## 2024-07-10 PROCEDURE — 99291 CRITICAL CARE FIRST HOUR: CPT | Mod: 25 | Performed by: EMERGENCY MEDICINE

## 2024-07-10 PROCEDURE — 72125 CT NECK SPINE W/O DYE: CPT

## 2024-07-10 PROCEDURE — 72170 X-RAY EXAM OF PELVIS: CPT | Mod: FOREIGN READ | Performed by: RADIOLOGY

## 2024-07-10 PROCEDURE — 73030 X-RAY EXAM OF SHOULDER: CPT | Mod: LT

## 2024-07-10 PROCEDURE — 85025 COMPLETE CBC W/AUTO DIFF WBC: CPT | Performed by: EMERGENCY MEDICINE

## 2024-07-10 PROCEDURE — 70450 CT HEAD/BRAIN W/O DYE: CPT

## 2024-07-10 PROCEDURE — 82077 ASSAY SPEC XCP UR&BREATH IA: CPT | Performed by: EMERGENCY MEDICINE

## 2024-07-10 PROCEDURE — 73030 X-RAY EXAM OF SHOULDER: CPT | Mod: LEFT SIDE | Performed by: RADIOLOGY

## 2024-07-10 RX ORDER — ACETAMINOPHEN 325 MG/1
975 TABLET ORAL ONCE
Status: COMPLETED | OUTPATIENT
Start: 2024-07-10 | End: 2024-07-10

## 2024-07-10 ASSESSMENT — LIFESTYLE VARIABLES
HAVE PEOPLE ANNOYED YOU BY CRITICIZING YOUR DRINKING: NO
HAVE YOU EVER FELT YOU SHOULD CUT DOWN ON YOUR DRINKING: NO
TOTAL SCORE: 0
EVER HAD A DRINK FIRST THING IN THE MORNING TO STEADY YOUR NERVES TO GET RID OF A HANGOVER: NO
EVER FELT BAD OR GUILTY ABOUT YOUR DRINKING: NO

## 2024-07-10 ASSESSMENT — COLUMBIA-SUICIDE SEVERITY RATING SCALE - C-SSRS
2. HAVE YOU ACTUALLY HAD ANY THOUGHTS OF KILLING YOURSELF?: NO
6. HAVE YOU EVER DONE ANYTHING, STARTED TO DO ANYTHING, OR PREPARED TO DO ANYTHING TO END YOUR LIFE?: NO
1. IN THE PAST MONTH, HAVE YOU WISHED YOU WERE DEAD OR WISHED YOU COULD GO TO SLEEP AND NOT WAKE UP?: NO

## 2024-07-10 ASSESSMENT — PAIN SCALES - GENERAL
PAINLEVEL_OUTOF10: 2
PAINLEVEL_OUTOF10: 4

## 2024-07-10 ASSESSMENT — PAIN - FUNCTIONAL ASSESSMENT
PAIN_FUNCTIONAL_ASSESSMENT: 0-10
PAIN_FUNCTIONAL_ASSESSMENT: 0-10

## 2024-07-10 ASSESSMENT — PAIN DESCRIPTION - LOCATION: LOCATION: FACE

## 2024-07-10 ASSESSMENT — PAIN DESCRIPTION - PAIN TYPE: TYPE: ACUTE PAIN

## 2024-07-10 NOTE — ED PROVIDER NOTES
HPI   Chief Complaint   Patient presents with    Fall     Pt tripped over her carpet and landed on the floor. Pt c/o rt shoulder, chest, face and neck pain. Pt on blood thinners.       Patient was at home and tripped over her new carpeting.  This occurred after she got up from her chair.  She fell and tried to protect her head and put her arm up.  She has an abrasion to her right elbow.  She complained of some pain in her right upper chest area and some right shoulder discomfort.  She also has some pain in her right leg was unable to ambulate.  She is notes the pain in her leg tween her knee and her low.  She did not lose consciousness.  She denies any fever chills cough or cold.  Because she takes Eliquis she was worked up as an HIA in the emergency department.  She denies frequent falls.  She denies any abdominal or back pain.  She arrived with a c-collar in place.                          Shonna Coma Scale Score: 15                     Patient History   Past Medical History:   Diagnosis Date    Allergic     Arthritis     Blood in stool 2022    Cancer (Multi)     Cataract     Disease of thyroid gland     Diverticulitis 2022    Eczema     Heart disease stent 2018    Hypertension     Inflammatory bowel disease     Urinary tract infection     Varicella     Visual impairment      Past Surgical History:   Procedure Laterality Date    BACK SURGERY  1973    CARDIAC CATHETERIZATION      CARDIAC SURGERY  ablasion & pace maker for afib    COLON SURGERY  2007    CORONARY STENT PLACEMENT  2018    HERNIA REPAIR  2007    JOINT REPLACEMENT  2019     Family History   Problem Relation Name Age of Onset    Cancer Mother Nereyda Yen     Heart disease Father Juanito Yen      Social History     Tobacco Use    Smoking status: Former     Current packs/day: 0.00     Average packs/day: 0.5 packs/day for 5.0 years (2.5 ttl pk-yrs)     Types: Cigarettes     Quit date: 1968     Years since quittin.5     Smokeless tobacco: Never   Vaping Use    Vaping status: Never Used   Substance Use Topics    Alcohol use: Not Currently    Drug use: Not Currently       Physical Exam   ED Triage Vitals [07/10/24 1314]   Temperature Heart Rate Respirations BP   36 °C (96.8 °F) 75 18 119/70      Pulse Ox Temp src Heart Rate Source Patient Position   99 % -- -- --      BP Location FiO2 (%)     -- --       Physical Exam  Vitals reviewed.   Constitutional:       General: She is awake.      Appearance: Normal appearance.   HENT:      Head: Normocephalic.      Nose: Nose normal.   Neck:      Comments: C-collar in place.  No pain with palpation of the C-spine     TLS no pain with palpation.      Cardiovascular:      Rate and Rhythm: Normal rate and regular rhythm.   Pulmonary:      Effort: Pulmonary effort is normal.      Breath sounds: Normal breath sounds.   Abdominal:      Palpations: Abdomen is soft.   Musculoskeletal:      Comments: Slightly decreased range of motion of the right humerus.  Some discomfort with palpation of the right shoulder.  Joint appears intact.  Good range of motion of the right elbow and right hand wrist.  No long bone deformity noted.    Left upper extremity with good range of motion no long bone deformity no bruising.     Left lower extremity no long bone deformity good range of motion no pain with movement of the knee or hip.    Right lower extremity with good flexion extension at the hip and flexion-extension at the knee.  Patient complains of some pain in the distal right tib-fib area.  No ankle pain.  No bruising seen.   Skin:     General: Skin is warm.      Capillary Refill: Capillary refill takes less than 2 seconds.      Comments: Skin tear in the right elbow which was cleaned and dressed.  Does not require closure.    Some bruising on the left knee with well-healed surgical scar from knee replacement.   Neurological:      Mental Status: She is alert.         ED Course & MDM   ED Course as of 07/10/24  1510   Wed Jul 10, 2024   1325 Patient fell at home and was brought by EMS worked up as an HIA.  I immediately saw her and ordered some imaging and labs.  She does not require pain medicine at this time we will go over for her CT x-ray and labs were drawn by nurses. [RZ]   1330 EKG done at 1321 interpreted by me shows normal sinus rhythm at 72 bpm with incomplete right bundle no old EKG found in MUSE no obvious ischemia no STEMI [RZ]   1424 C-collar was removed by myself after imaging came back.  Awaiting other tests including CT chest and plain film x-rays.  Daughter came in and I talked to her.  Daughter also wanted some images of her left knee which has some bruising and patient requested we do her right elbow.  She was given some Tylenol for pain. [RZ]   1425 Will attempt to ambulate and be discharged home.  Spoke to patient and daughter about the incidental findings on imaging. [RZ]   1508 Tetanus is up-to-date. [RZ]      ED Course User Index  [RZ] Gee Diop MD         Diagnoses as of 07/10/24 1510   Fall, initial encounter   Coagulopathy (Multi)   Skin tear of right elbow without complication, initial encounter   Contusion of left knee, initial encounter   Contusion of right elbow, initial encounter       Medical Decision Making      Procedure  Procedures     Gee Diop MD  07/10/24 1511

## 2024-07-10 NOTE — ED TRIAGE NOTES
Pt tripped over her carpet and landed on the floor. Pt c/o rt shoulder, chest, face and neck pain. Pt on blood thinners.

## 2024-07-11 LAB
ATRIAL RATE: 72 BPM
P AXIS: 20 DEGREES
P OFFSET: 164 MS
P ONSET: 111 MS
PR INTERVAL: 176 MS
Q ONSET: 199 MS
QRS COUNT: 12 BEATS
QRS DURATION: 112 MS
QT INTERVAL: 434 MS
QTC CALCULATION(BAZETT): 475 MS
QTC FREDERICIA: 461 MS
R AXIS: -21 DEGREES
T AXIS: 6 DEGREES
T OFFSET: 416 MS
VENTRICULAR RATE: 72 BPM

## 2024-07-29 ENCOUNTER — APPOINTMENT (OUTPATIENT)
Dept: RADIOLOGY | Facility: HOSPITAL | Age: 84
End: 2024-07-29
Payer: MEDICARE

## 2024-07-29 ENCOUNTER — HOSPITAL ENCOUNTER (EMERGENCY)
Facility: HOSPITAL | Age: 84
Discharge: HOME | End: 2024-07-29
Attending: EMERGENCY MEDICINE
Payer: MEDICARE

## 2024-07-29 VITALS
OXYGEN SATURATION: 98 % | HEIGHT: 66 IN | WEIGHT: 206 LBS | HEART RATE: 74 BPM | DIASTOLIC BLOOD PRESSURE: 78 MMHG | RESPIRATION RATE: 16 BRPM | BODY MASS INDEX: 33.11 KG/M2 | SYSTOLIC BLOOD PRESSURE: 149 MMHG | TEMPERATURE: 97.7 F

## 2024-07-29 DIAGNOSIS — R10.9 ABDOMINAL PAIN, UNSPECIFIED ABDOMINAL LOCATION: Primary | ICD-10-CM

## 2024-07-29 LAB
ALBUMIN SERPL BCP-MCNC: 3.9 G/DL (ref 3.4–5)
ALP SERPL-CCNC: 98 U/L (ref 33–136)
ALT SERPL W P-5'-P-CCNC: 33 U/L (ref 7–45)
ANION GAP SERPL CALC-SCNC: 16 MMOL/L (ref 10–20)
APPEARANCE UR: CLEAR
AST SERPL W P-5'-P-CCNC: 36 U/L (ref 9–39)
BACTERIA #/AREA URNS AUTO: ABNORMAL /HPF
BASOPHILS # BLD AUTO: 0.04 X10*3/UL (ref 0–0.1)
BASOPHILS NFR BLD AUTO: 0.4 %
BILIRUB SERPL-MCNC: 0.7 MG/DL (ref 0–1.2)
BILIRUB UR STRIP.AUTO-MCNC: NEGATIVE MG/DL
BUN SERPL-MCNC: 12 MG/DL (ref 6–23)
CALCIUM SERPL-MCNC: 9.1 MG/DL (ref 8.6–10.3)
CHLORIDE SERPL-SCNC: 93 MMOL/L (ref 98–107)
CO2 SERPL-SCNC: 24 MMOL/L (ref 21–32)
COLOR UR: NORMAL
CREAT SERPL-MCNC: 0.71 MG/DL (ref 0.5–1.05)
EGFRCR SERPLBLD CKD-EPI 2021: 85 ML/MIN/1.73M*2
EOSINOPHIL # BLD AUTO: 0.04 X10*3/UL (ref 0–0.4)
EOSINOPHIL NFR BLD AUTO: 0.4 %
ERYTHROCYTE [DISTWIDTH] IN BLOOD BY AUTOMATED COUNT: 14.6 % (ref 11.5–14.5)
GLUCOSE SERPL-MCNC: 95 MG/DL (ref 74–99)
GLUCOSE UR STRIP.AUTO-MCNC: NORMAL MG/DL
HCT VFR BLD AUTO: 37.9 % (ref 36–46)
HGB BLD-MCNC: 13 G/DL (ref 12–16)
IMM GRANULOCYTES # BLD AUTO: 0.02 X10*3/UL (ref 0–0.5)
IMM GRANULOCYTES NFR BLD AUTO: 0.2 % (ref 0–0.9)
KETONES UR STRIP.AUTO-MCNC: NEGATIVE MG/DL
LEUKOCYTE ESTERASE UR QL STRIP.AUTO: NEGATIVE
LIPASE SERPL-CCNC: 18 U/L (ref 9–82)
LYMPHOCYTES # BLD AUTO: 1.23 X10*3/UL (ref 0.8–3)
LYMPHOCYTES NFR BLD AUTO: 13.2 %
MCH RBC QN AUTO: 31 PG (ref 26–34)
MCHC RBC AUTO-ENTMCNC: 34.3 G/DL (ref 32–36)
MCV RBC AUTO: 91 FL (ref 80–100)
MONOCYTES # BLD AUTO: 0.82 X10*3/UL (ref 0.05–0.8)
MONOCYTES NFR BLD AUTO: 8.8 %
MUCOUS THREADS #/AREA URNS AUTO: ABNORMAL /LPF
NEUTROPHILS # BLD AUTO: 7.18 X10*3/UL (ref 1.6–5.5)
NEUTROPHILS NFR BLD AUTO: 77 %
NITRITE UR QL STRIP.AUTO: NEGATIVE
NRBC BLD-RTO: 0 /100 WBCS (ref 0–0)
PH UR STRIP.AUTO: 6.5 [PH]
PLATELET # BLD AUTO: 241 X10*3/UL (ref 150–450)
POTASSIUM SERPL-SCNC: 4.2 MMOL/L (ref 3.5–5.3)
PROT SERPL-MCNC: 7.1 G/DL (ref 6.4–8.2)
PROT UR STRIP.AUTO-MCNC: NORMAL MG/DL
RBC # BLD AUTO: 4.19 X10*6/UL (ref 4–5.2)
RBC # UR STRIP.AUTO: NEGATIVE /UL
RBC #/AREA URNS AUTO: ABNORMAL /HPF
SODIUM SERPL-SCNC: 129 MMOL/L (ref 136–145)
SP GR UR STRIP.AUTO: 1.01
SQUAMOUS #/AREA URNS AUTO: ABNORMAL /HPF
UROBILINOGEN UR STRIP.AUTO-MCNC: NORMAL MG/DL
WBC # BLD AUTO: 9.3 X10*3/UL (ref 4.4–11.3)
WBC #/AREA URNS AUTO: ABNORMAL /HPF

## 2024-07-29 PROCEDURE — 99284 EMERGENCY DEPT VISIT MOD MDM: CPT

## 2024-07-29 PROCEDURE — 85025 COMPLETE CBC W/AUTO DIFF WBC: CPT | Performed by: STUDENT IN AN ORGANIZED HEALTH CARE EDUCATION/TRAINING PROGRAM

## 2024-07-29 PROCEDURE — 36415 COLL VENOUS BLD VENIPUNCTURE: CPT | Performed by: STUDENT IN AN ORGANIZED HEALTH CARE EDUCATION/TRAINING PROGRAM

## 2024-07-29 PROCEDURE — 84075 ASSAY ALKALINE PHOSPHATASE: CPT | Performed by: STUDENT IN AN ORGANIZED HEALTH CARE EDUCATION/TRAINING PROGRAM

## 2024-07-29 PROCEDURE — 2550000001 HC RX 255 CONTRASTS: Performed by: EMERGENCY MEDICINE

## 2024-07-29 PROCEDURE — 81001 URINALYSIS AUTO W/SCOPE: CPT | Performed by: STUDENT IN AN ORGANIZED HEALTH CARE EDUCATION/TRAINING PROGRAM

## 2024-07-29 PROCEDURE — 74177 CT ABD & PELVIS W/CONTRAST: CPT | Performed by: RADIOLOGY

## 2024-07-29 PROCEDURE — 83690 ASSAY OF LIPASE: CPT | Performed by: STUDENT IN AN ORGANIZED HEALTH CARE EDUCATION/TRAINING PROGRAM

## 2024-07-29 PROCEDURE — 74177 CT ABD & PELVIS W/CONTRAST: CPT

## 2024-07-29 RX ORDER — ACETAMINOPHEN 325 MG/1
650 TABLET ORAL ONCE
Status: COMPLETED | OUTPATIENT
Start: 2024-07-29 | End: 2024-07-29

## 2024-07-29 ASSESSMENT — LIFESTYLE VARIABLES
EVER FELT BAD OR GUILTY ABOUT YOUR DRINKING: NO
HAVE YOU EVER FELT YOU SHOULD CUT DOWN ON YOUR DRINKING: NO
EVER HAD A DRINK FIRST THING IN THE MORNING TO STEADY YOUR NERVES TO GET RID OF A HANGOVER: NO
HAVE PEOPLE ANNOYED YOU BY CRITICIZING YOUR DRINKING: NO
TOTAL SCORE: 0

## 2024-07-29 ASSESSMENT — PAIN SCALES - GENERAL
PAINLEVEL_OUTOF10: 7
PAINLEVEL_OUTOF10: 0 - NO PAIN
PAINLEVEL_OUTOF10: 2
PAINLEVEL_OUTOF10: 8

## 2024-07-29 ASSESSMENT — COLUMBIA-SUICIDE SEVERITY RATING SCALE - C-SSRS
1. IN THE PAST MONTH, HAVE YOU WISHED YOU WERE DEAD OR WISHED YOU COULD GO TO SLEEP AND NOT WAKE UP?: NO
6. HAVE YOU EVER DONE ANYTHING, STARTED TO DO ANYTHING, OR PREPARED TO DO ANYTHING TO END YOUR LIFE?: NO
2. HAVE YOU ACTUALLY HAD ANY THOUGHTS OF KILLING YOURSELF?: NO

## 2024-07-29 ASSESSMENT — PAIN DESCRIPTION - PAIN TYPE: TYPE: ACUTE PAIN

## 2024-07-29 ASSESSMENT — PAIN DESCRIPTION - ORIENTATION
ORIENTATION: LEFT;UPPER
ORIENTATION: LEFT

## 2024-07-29 ASSESSMENT — PAIN DESCRIPTION - LOCATION
LOCATION: ABDOMEN
LOCATION: ABDOMEN

## 2024-07-29 ASSESSMENT — PAIN - FUNCTIONAL ASSESSMENT: PAIN_FUNCTIONAL_ASSESSMENT: 0-10

## 2024-07-29 ASSESSMENT — PAIN DESCRIPTION - DIRECTION: RADIATING_TOWARDS: TO BACK

## 2024-07-29 NOTE — ED TRIAGE NOTES
POV for left upper/lateral quadrant abd px radiating to her back and left groin. Pt is new to area, no PCM established yet. C/o loose bowels Sat night and yesterday. Denies n/v, elisabeth blood/melena. Px woke pt up this am 0300 again. H/o diverticulitis.

## 2024-08-20 ENCOUNTER — APPOINTMENT (OUTPATIENT)
Dept: PRIMARY CARE | Facility: CLINIC | Age: 84
End: 2024-08-20
Payer: MEDICARE

## 2024-08-20 VITALS
OXYGEN SATURATION: 98 % | DIASTOLIC BLOOD PRESSURE: 76 MMHG | BODY MASS INDEX: 33.82 KG/M2 | HEART RATE: 54 BPM | WEIGHT: 210.4 LBS | SYSTOLIC BLOOD PRESSURE: 136 MMHG | HEIGHT: 66 IN

## 2024-08-20 DIAGNOSIS — R60.0 BILATERAL LEG EDEMA: ICD-10-CM

## 2024-08-20 DIAGNOSIS — F41.9 ANXIETY: Primary | ICD-10-CM

## 2024-08-20 DIAGNOSIS — I48.0 PAROXYSMAL ATRIAL FIBRILLATION (MULTI): ICD-10-CM

## 2024-08-20 DIAGNOSIS — H26.9 CATARACT OF BOTH EYES, UNSPECIFIED CATARACT TYPE: ICD-10-CM

## 2024-08-20 DIAGNOSIS — M15.9 PRIMARY OSTEOARTHRITIS INVOLVING MULTIPLE JOINTS: ICD-10-CM

## 2024-08-20 DIAGNOSIS — I10 BENIGN ESSENTIAL HYPERTENSION: ICD-10-CM

## 2024-08-20 DIAGNOSIS — R09.82 POST-NASAL DISCHARGE: ICD-10-CM

## 2024-08-20 DIAGNOSIS — M25.50 ARTHRALGIA, UNSPECIFIED JOINT: ICD-10-CM

## 2024-08-20 LAB
AMPHETAMINES UR QL SCN: NORMAL
BARBITURATES UR QL SCN: NORMAL
BENZODIAZ UR QL SCN: NORMAL
BZE UR QL SCN: NORMAL
CANNABINOIDS UR QL SCN: NORMAL
FENTANYL+NORFENTANYL UR QL SCN: NORMAL
METHADONE UR QL SCN: NORMAL
OPIATES UR QL SCN: NORMAL
OXYCODONE+OXYMORPHONE UR QL SCN: NORMAL
PCP UR QL SCN: NORMAL

## 2024-08-20 PROCEDURE — 1036F TOBACCO NON-USER: CPT | Performed by: NURSE PRACTITIONER

## 2024-08-20 PROCEDURE — 3078F DIAST BP <80 MM HG: CPT | Performed by: NURSE PRACTITIONER

## 2024-08-20 PROCEDURE — 1159F MED LIST DOCD IN RCRD: CPT | Performed by: NURSE PRACTITIONER

## 2024-08-20 PROCEDURE — 1123F ACP DISCUSS/DSCN MKR DOCD: CPT | Performed by: NURSE PRACTITIONER

## 2024-08-20 PROCEDURE — 80307 DRUG TEST PRSMV CHEM ANLYZR: CPT

## 2024-08-20 PROCEDURE — 99214 OFFICE O/P EST MOD 30 MIN: CPT | Performed by: NURSE PRACTITIONER

## 2024-08-20 PROCEDURE — 3075F SYST BP GE 130 - 139MM HG: CPT | Performed by: NURSE PRACTITIONER

## 2024-08-20 PROCEDURE — 1158F ADVNC CARE PLAN TLK DOCD: CPT | Performed by: NURSE PRACTITIONER

## 2024-08-20 RX ORDER — DICLOFENAC SODIUM 75 MG/1
75 TABLET, DELAYED RELEASE ORAL 3 TIMES DAILY PRN
COMMUNITY

## 2024-08-20 RX ORDER — IRBESARTAN 300 MG/1
150 TABLET ORAL 2 TIMES DAILY
Qty: 90 TABLET | Refills: 3 | Status: SHIPPED | OUTPATIENT
Start: 2024-08-20 | End: 2025-08-20

## 2024-08-20 RX ORDER — CLINDAMYCIN HYDROCHLORIDE 300 MG/1
CAPSULE ORAL
COMMUNITY

## 2024-08-20 RX ORDER — LORAZEPAM 0.5 MG/1
0.5 TABLET ORAL NIGHTLY
Qty: 30 TABLET | Refills: 0 | Status: SHIPPED | OUTPATIENT
Start: 2024-08-20 | End: 2024-09-19

## 2024-08-20 RX ORDER — INDAPAMIDE 2.5 MG/1
2.5 TABLET ORAL DAILY
Qty: 90 TABLET | Refills: 3 | Status: SHIPPED | OUTPATIENT
Start: 2024-08-20 | End: 2025-08-20

## 2024-08-20 ASSESSMENT — ENCOUNTER SYMPTOMS
WOUND: 0
DIZZINESS: 0
SHORTNESS OF BREATH: 0
FEVER: 0
COUGH: 1
PALPITATIONS: 0
HEADACHES: 0
COLOR CHANGE: 0
EYE PAIN: 0
JOINT SWELLING: 0
SEIZURES: 0
BRUISES/BLEEDS EASILY: 1
ARTHRALGIAS: 1
CONSTIPATION: 0
ABDOMINAL DISTENTION: 0
NAUSEA: 0
WEAKNESS: 0
CHILLS: 0
DIARRHEA: 0
DIFFICULTY URINATING: 0
ABDOMINAL PAIN: 0
BACK PAIN: 1
WHEEZING: 0
FATIGUE: 0
TROUBLE SWALLOWING: 0
ADENOPATHY: 0
MYALGIAS: 0

## 2024-08-20 NOTE — PROGRESS NOTES
Subjective   Patient ID: Danisha Marsh is a 84 y.o. female who presents for Pre-op Exam (MED CLEARANCE - CATARCT 9/9,9/16 - DR PAVEL WEST).    Patient seen today in order to complete history and physical for upcoming cataract surgery as well as for medication management. Patient seen sitting up in office, in no acute distress.  She is alert, oriented and appears in good spirits.  Patient reports falling at home approximately 1 month ago after she tripped over a rug.  She was taken to the emergency department and imaging was negative for any acute fracture.  She is since followed up with orthopedics for additional treatment and recommendations.  She states that the left knee is still sore but pain is improving and the bruising to her right elbow is improving as well.  Patient also reports some chronic right hip pain for which she has seen orthopedic for in the past.  She states she will follow-up with them once again for additional interventions such as steroidal injections. Patient admits to chronic pain issues in both hips and lower back.  She has a past medical history of spinal surgery and has no desire to undergo any additional surgical interventions at this time.    Patient is scheduled to undergo bilateral cataract surgery next month.  She currently lives alone but her family will be able to provide transportation and assist with her care.  Patient has already received cardiac clearance for the procedure.  Patient admits to some issues with anxiety and nighttime which began after the loss of her .  She has been routinely taking lorazepam low-dose prior to bedtime to help with anxiety/associated insomnia.  Patient is requesting that this medication be refilled today.  No significant reports of depression at this time.  Increased bilateral lower extremity edema reported.  Patient is requesting to resume her diuretic once daily instead of every other day.  She states that her legs for better with daily  diuretic dosing but this was previously stopped due to chronic hyponatremia.  Patient denies any associated shortness of breath, chest pain/tightness or other cardiac concerns.  Chronic cough reported but patient states this is secondary to postnasal drip. Zytrec helps to control her symptoms. Patient follows routinely with gastroenterology due to history of diverticulosis and colon cancer.  She reports no issues currently with bowel or bladder.  No reported issues with appetite or staying hydrated. Medications reviewed.  No other acute concerns voiced at this time.        Current Outpatient Medications on File Prior to Visit   Medication Sig Dispense Refill    acetaminophen (Tylenol) 325 mg tablet Take 2 tablets (650 mg) by mouth every 6 hours if needed for moderate pain (4 - 6).      amiodarone (Pacerone) 200 mg tablet Take 1 tablet (200 mg) by mouth early in the morning..      amLODIPine (Norvasc) 5 mg tablet Take 1 tablet (5 mg) by mouth every 12 hours.      clindamycin (Cleocin) 300 mg capsule Take two capsules one hour before your dentist appointment      diclofenac (Voltaren) 75 mg EC tablet Take 1 tablet (75 mg) by mouth 3 times a day as needed.      Eliquis 5 mg tablet Take 1 tablet (5 mg) by mouth every 12 hours.      guaiFENesin (Robitussin) 100 mg/5 mL syrup Take 10 mL (200 mg) by mouth 3 times a day as needed for cough.      methIMAzole (Tapazole) 5 mg tablet 1 tab daily then 1 tab in the evening every other day 154 tablet 1    methocarbamol (Robaxin) 500 mg tablet Take 1 tablet (500 mg) by mouth 3 times a day.      metoprolol tartrate (Lopressor) 50 mg tablet Take 1 tablet by mouth every 12 hours. 180 tablet 1    nystatin (Mycostatin) 100,000 unit/gram powder Apply 1 Application topically 2 times a day. 60 g 2    nystatin (Mycostatin) cream Apply topically if needed (Applied to affected area twice daily as needed). 60 g 2    rosuvastatin (Crestor) 20 mg tablet Take 1 tablet (20 mg) by mouth once daily  at bedtime.      [DISCONTINUED] indapamide (Lozol) 2.5 mg tablet Take 1 tablet (2.5 mg) by mouth every other day.      [DISCONTINUED] irbesartan (Avapro) 300 mg tablet Take 0.5 tablets (150 mg) by mouth 2 times a day.      [DISCONTINUED] LORazepam (Ativan) 0.5 mg tablet Take 1 tablet (0.5 mg) by mouth once daily at bedtime.       No current facility-administered medications on file prior to visit.       Past Medical History:   Diagnosis Date    Allergic     Arthritis     Blood in stool 12/27/2022    Cancer (Multi) 2007    Cataract 2024    Disease of thyroid gland     Diverticulitis 12/27/2022    Eczema     Heart disease stent 2018    Hypertension     Inflammatory bowel disease     Urinary tract infection     Varicella     Visual impairment         Past Surgical History:   Procedure Laterality Date    BACK SURGERY  1973    CARDIAC CATHETERIZATION      CARDIAC SURGERY  ablasion & pace maker for afib    COLON SURGERY  2007    CORONARY STENT PLACEMENT  2018    HERNIA REPAIR  2007    JOINT REPLACEMENT  2019        Family History   Problem Relation Name Age of Onset    Cancer Mother Nereyda Yen     Heart disease Father Juanito Yen         Review of Systems   Constitutional:  Negative for chills, fatigue and fever.   HENT:  Positive for postnasal drip. Negative for dental problem and trouble swallowing.    Eyes:  Negative for pain and visual disturbance.        Wears glasses   Respiratory:  Positive for cough. Negative for shortness of breath and wheezing.         Positive for chronic cough secondary to PND   Cardiovascular:  Negative for chest pain, palpitations and leg swelling.   Gastrointestinal:  Negative for abdominal distention, abdominal pain, constipation, diarrhea and nausea.        Positive for colon cancer history   Endocrine: Negative for cold intolerance and heat intolerance.   Genitourinary:  Negative for difficulty urinating.   Musculoskeletal:  Positive for arthralgias, back pain and gait problem.  "Negative for joint swelling and myalgias.        Positive for chronic bilateral hip and lower back pain.    Skin:  Negative for color change, pallor, rash and wound.        Positive skin cancer history.   Allergic/Immunologic: Negative for environmental allergies and food allergies.   Neurological:  Negative for dizziness, seizures, weakness and headaches.   Hematological:  Negative for adenopathy. Bruises/bleeds easily.   Psychiatric/Behavioral:  Negative for behavioral problems.    All other systems reviewed and are negative.      Objective   /76   Pulse 54   Ht 1.676 m (5' 6\")   Wt 95.4 kg (210 lb 6.4 oz)   SpO2 98%   BMI 33.96 kg/m²     Physical Exam  Constitutional:       General: She is not in acute distress.     Appearance: Normal appearance. She is not toxic-appearing.   HENT:      Head: Normocephalic and atraumatic.      Right Ear: External ear normal.      Left Ear: External ear normal.      Nose: Nose normal.      Mouth/Throat:      Mouth: Mucous membranes are moist.      Pharynx: Oropharynx is clear.      Comments: Missing just a few teeth.  Overall fair dentition  Eyes:      Extraocular Movements: Extraocular movements intact.      Conjunctiva/sclera: Conjunctivae normal.   Cardiovascular:      Rate and Rhythm: Normal rate and regular rhythm.      Pulses: Normal pulses.      Heart sounds: Murmur heard.   Pulmonary:      Effort: Pulmonary effort is normal.      Breath sounds: Normal breath sounds. No wheezing.   Abdominal:      General: Bowel sounds are normal.      Palpations: Abdomen is soft.   Musculoskeletal:         General: Swelling present.      Cervical back: Normal range of motion and neck supple.      Comments: Nonpitting bilateral lower extremity edema. Patient ambulates with a cane   Skin:     General: Skin is warm and dry.      Findings: Bruising present.      Comments: Improved bruising to right elbow.  Venous staining to BLE. Varicose veins to BLE. Small skin lesion to left " lower calf and right forearm; scaly and raised.  No drainage, warmth or surrounding erythema.   Neurological:      General: No focal deficit present.      Mental Status: She is alert and oriented to person, place, and time. Mental status is at baseline.      Cranial Nerves: No cranial nerve deficit.      Motor: No weakness.   Psychiatric:         Mood and Affect: Mood normal.         Behavior: Behavior normal.         Thought Content: Thought content normal.         Judgment: Judgment normal.         Assessment/Plan   Problem List Items Addressed This Visit             ICD-10-CM    Post-nasal discharge R09.82     Patient to continue with as needed Zyrtec and nasal spray for symptom control.  If symptoms persist, consider ENT follow-up.         Paroxysmal atrial fibrillation (Multi) I48.0     Rate controlled with metoprolol and amiodarone.  Patient continues on Eliquis 5 mg twice daily.  She is to notify provider for any persistent issues with excessive bruising or bleeding.         Benign essential hypertension I10     Relatively stable.  She is to continue to monitor her blood pressure at home and notify provider for any persistent issues with hyper or hypotension.  She is also to notify provider for any new cardiac concerns.  Patient to maintain routine follow-up with cardiology.         Relevant Medications    indapamide (Lozol) 2.5 mg tablet    irbesartan (Avapro) 300 mg tablet    Other Relevant Orders    Basic Metabolic Panel    Primary osteoarthritis involving multiple joints M15.9     Patient to continue routine follow-up with orthopedics for pain management of bilateral hips and lower back.  Physical therapy declined at this time.         Anxiety - Primary F41.9     Will refill patient's low-dose lorazepam.  Informed consent signed in drug screening urine obtained today.  Patient to notify provider for any persistent or worsening mood concerns.         Relevant Medications    LORazepam (Ativan) 0.5 mg tablet     Other Relevant Orders    Drug Screen, Urine With Reflex to Confirmation    Bilateral leg edema R60.0     Will increase patient's indapamide back to once daily dosing.  Orders are placed for blood work to be completed within the next few weeks to reassess patient's sodium levels.  Patient to notify provider for any persistent or worsening edema concerns.         Cataract of both eyes H26.9     Patient is cleared from a primary care perspective for cataract surgery to be completed next month          Other Visit Diagnoses         Codes    Arthralgia, unspecified joint     M25.50    Relevant Orders    Drug Screen, Urine With Reflex to Confirmation

## 2024-08-20 NOTE — ASSESSMENT & PLAN NOTE
Will increase patient's indapamide back to once daily dosing.  Orders are placed for blood work to be completed within the next few weeks to reassess patient's sodium levels.  Patient to notify provider for any persistent or worsening edema concerns.

## 2024-08-20 NOTE — PATIENT INSTRUCTIONS
Please arrange for follow-up in 3 months    Have blood work completed in one month    You may have your blood work completed in this building through [facility] ([address]  [address], [place] [address]).  Hours:   Monday - Friday: 7:30 a.m. - 5 p.m. and Saturday: 8 a.m. - 12 p.m.  Phone:  [phone]

## 2024-08-20 NOTE — ASSESSMENT & PLAN NOTE
Patient to continue with as needed Zyrtec and nasal spray for symptom control.  If symptoms persist, consider ENT follow-up.

## 2024-08-20 NOTE — ASSESSMENT & PLAN NOTE
Relatively stable.  She is to continue to monitor her blood pressure at home and notify provider for any persistent issues with hyper or hypotension.  She is also to notify provider for any new cardiac concerns.  Patient to maintain routine follow-up with cardiology.

## 2024-08-20 NOTE — ASSESSMENT & PLAN NOTE
Patient is cleared from a primary care perspective for cataract surgery to be completed next month

## 2024-08-20 NOTE — ASSESSMENT & PLAN NOTE
Will refill patient's low-dose lorazepam.  Informed consent signed in drug screening urine obtained today.  Patient to notify provider for any persistent or worsening mood concerns.

## 2024-09-23 ENCOUNTER — OFFICE VISIT (OUTPATIENT)
Dept: PRIMARY CARE | Facility: CLINIC | Age: 84
End: 2024-09-23
Payer: MEDICARE

## 2024-09-23 VITALS
HEIGHT: 66 IN | BODY MASS INDEX: 32.78 KG/M2 | DIASTOLIC BLOOD PRESSURE: 72 MMHG | OXYGEN SATURATION: 97 % | WEIGHT: 204 LBS | HEART RATE: 53 BPM | SYSTOLIC BLOOD PRESSURE: 128 MMHG

## 2024-09-23 DIAGNOSIS — K57.92 DIVERTICULITIS: Primary | ICD-10-CM

## 2024-09-23 DIAGNOSIS — R09.82 POST-NASAL DRIP: ICD-10-CM

## 2024-09-23 DIAGNOSIS — F41.9 ANXIETY: ICD-10-CM

## 2024-09-23 PROCEDURE — 3078F DIAST BP <80 MM HG: CPT | Performed by: NURSE PRACTITIONER

## 2024-09-23 PROCEDURE — 3074F SYST BP LT 130 MM HG: CPT | Performed by: NURSE PRACTITIONER

## 2024-09-23 PROCEDURE — 1123F ACP DISCUSS/DSCN MKR DOCD: CPT | Performed by: NURSE PRACTITIONER

## 2024-09-23 PROCEDURE — 1159F MED LIST DOCD IN RCRD: CPT | Performed by: NURSE PRACTITIONER

## 2024-09-23 PROCEDURE — 1158F ADVNC CARE PLAN TLK DOCD: CPT | Performed by: NURSE PRACTITIONER

## 2024-09-23 PROCEDURE — 1036F TOBACCO NON-USER: CPT | Performed by: NURSE PRACTITIONER

## 2024-09-23 PROCEDURE — 1160F RVW MEDS BY RX/DR IN RCRD: CPT | Performed by: NURSE PRACTITIONER

## 2024-09-23 PROCEDURE — 99213 OFFICE O/P EST LOW 20 MIN: CPT | Performed by: NURSE PRACTITIONER

## 2024-09-23 RX ORDER — AMOXICILLIN AND CLAVULANATE POTASSIUM 875; 125 MG/1; MG/1
875 TABLET, FILM COATED ORAL 2 TIMES DAILY
Qty: 20 TABLET | Refills: 0 | Status: SHIPPED | OUTPATIENT
Start: 2024-09-23 | End: 2024-10-03

## 2024-09-23 RX ORDER — AZELASTINE HYDROCHLORIDE, FLUTICASONE PROPIONATE 137; 50 UG/1; UG/1
1 SPRAY, METERED NASAL 2 TIMES DAILY
Qty: 1 EACH | Refills: 0 | Status: SHIPPED | OUTPATIENT
Start: 2024-09-23

## 2024-09-23 RX ORDER — LORAZEPAM 0.5 MG/1
0.5 TABLET ORAL NIGHTLY
Qty: 30 TABLET | Refills: 0 | Status: SHIPPED | OUTPATIENT
Start: 2024-09-23 | End: 2024-10-23

## 2024-09-23 ASSESSMENT — ENCOUNTER SYMPTOMS
HEADACHES: 0
FEVER: 0
DIZZINESS: 0
SEIZURES: 0
ADENOPATHY: 0
DIARRHEA: 1
BRUISES/BLEEDS EASILY: 1
JOINT SWELLING: 0
EYE PAIN: 0
COUGH: 1
SHORTNESS OF BREATH: 0
CHILLS: 0
MYALGIAS: 0
WEAKNESS: 0
ARTHRALGIAS: 1
TROUBLE SWALLOWING: 0
WOUND: 0
WHEEZING: 0
BACK PAIN: 1
DIFFICULTY URINATING: 0
ABDOMINAL DISTENTION: 0
FATIGUE: 0
ABDOMINAL PAIN: 1
COLOR CHANGE: 0
CONSTIPATION: 0
NAUSEA: 0
PALPITATIONS: 0

## 2024-09-23 NOTE — PROGRESS NOTES
Subjective   Patient ID: Danisha Marsh is a 84 y.o. female who presents for Diverticulitis.    Patient seen today due to concerns of diverticulitis flare.  Patient seen sitting up in office, in no acute distress.  She is alert, oriented and appears in good spirits.  Patient reports developing loose stools with left lower quadrant abdominal pain approximately 4 to 5 days ago.  She reports multiple stools a day but denies any fever or chills.  Patient has a history of diverticulitis and is already started on a liquid diet to assist with symptom control.  She states she would normally follow-up with gastroenterology for these concerns but recently underwent cataract surgery and is concerned about driving too far.  Patient denies any blood or mucus in the stools.  Occasional nausea reported but patient is otherwise tolerating oral intake without issue.  She reports that her last diverticulitis flare was in March of this year.  Patient admits to some continued issues with anxiety at nighttime which began after the loss of her .  She has been routinely taking lorazepam low-dose prior to bedtime to help with anxiety/associated insomnia.  Patient is requesting that this medication be refilled today.  No significant reports of depression at this time.  No indication for counseling/psychiatric referral at this time.  Chronic cough reported but patient states this is secondary to postnasal drip. Zytrec and Robitussin helps to control her symptoms but she would like to try something else to see if it will help her symptoms go away completely. Medications reviewed.  No other acute concerns voiced at this time.          Current Outpatient Medications on File Prior to Visit   Medication Sig Dispense Refill    acetaminophen (Tylenol) 325 mg tablet Take 2 tablets (650 mg) by mouth every 6 hours if needed for moderate pain (4 - 6).      amiodarone (Pacerone) 200 mg tablet Take 1 tablet (200 mg) by mouth early in the  morning..      amLODIPine (Norvasc) 5 mg tablet Take 1 tablet (5 mg) by mouth every 12 hours.      clindamycin (Cleocin) 300 mg capsule Take two capsules one hour before your dentist appointment      diclofenac (Voltaren) 75 mg EC tablet Take 1 tablet (75 mg) by mouth 3 times a day as needed.      Eliquis 5 mg tablet Take 1 tablet (5 mg) by mouth every 12 hours.      guaiFENesin (Robitussin) 100 mg/5 mL syrup Take 10 mL (200 mg) by mouth 3 times a day as needed for cough.      indapamide (Lozol) 2.5 mg tablet Take 1 tablet (2.5 mg) by mouth once daily. 90 tablet 3    irbesartan (Avapro) 300 mg tablet Take 0.5 tablets (150 mg) by mouth 2 times a day. 90 tablet 3    methIMAzole (Tapazole) 5 mg tablet 1 tab daily then 1 tab in the evening every other day 154 tablet 1    methocarbamol (Robaxin) 500 mg tablet Take 1 tablet (500 mg) by mouth 3 times a day.      metoprolol tartrate (Lopressor) 50 mg tablet Take 1 tablet by mouth every 12 hours. 180 tablet 1    nystatin (Mycostatin) 100,000 unit/gram powder Apply 1 Application topically 2 times a day. 60 g 2    nystatin (Mycostatin) cream Apply topically if needed (Applied to affected area twice daily as needed). 60 g 2    rosuvastatin (Crestor) 20 mg tablet Take 1 tablet (20 mg) by mouth once daily at bedtime.      [DISCONTINUED] LORazepam (Ativan) 0.5 mg tablet Take 1 tablet (0.5 mg) by mouth once daily at bedtime. 30 tablet 0     No current facility-administered medications on file prior to visit.       Past Medical History:   Diagnosis Date    Allergic     Arthritis     Blood in stool 12/27/2022    Cancer (Multi) 2007    Cataract 2024    Disease of thyroid gland     Diverticulitis 12/27/2022    Eczema     Heart disease stent 2018    Hypertension     Inflammatory bowel disease     Urinary tract infection     Varicella     Visual impairment         Past Surgical History:   Procedure Laterality Date    BACK SURGERY  1973    CARDIAC CATHETERIZATION      CARDIAC SURGERY   "ablasion & pace maker for afib    COLON SURGERY  2007    CORONARY STENT PLACEMENT  2018    HERNIA REPAIR  2007    JOINT REPLACEMENT  2019        Family History   Problem Relation Name Age of Onset    Cancer Mother Nereyda Yen     Heart disease Father Juanito Yen         Review of Systems   Constitutional:  Negative for chills, fatigue and fever.   HENT:  Positive for postnasal drip. Negative for dental problem and trouble swallowing.    Eyes:  Negative for pain and visual disturbance.        Wears glasses.  Underwent bilateral cataract surgery   Respiratory:  Positive for cough. Negative for shortness of breath and wheezing.         Positive for chronic cough secondary to PND   Cardiovascular:  Negative for chest pain, palpitations and leg swelling.   Gastrointestinal:  Positive for abdominal pain and diarrhea. Negative for abdominal distention, constipation and nausea.        Positive for colon cancer history.  See HPI   Endocrine: Negative for cold intolerance and heat intolerance.   Genitourinary:  Negative for difficulty urinating.   Musculoskeletal:  Positive for arthralgias, back pain and gait problem. Negative for joint swelling and myalgias.        Positive for chronic bilateral hip and lower back pain.    Skin:  Negative for color change, pallor, rash and wound.        Positive skin cancer history.   Allergic/Immunologic: Negative for environmental allergies and food allergies.   Neurological:  Negative for dizziness, seizures, weakness and headaches.   Hematological:  Negative for adenopathy. Bruises/bleeds easily.   Psychiatric/Behavioral:  Negative for behavioral problems.    All other systems reviewed and are negative.      Objective   /72   Pulse 53   Ht 1.676 m (5' 6\")   Wt 92.5 kg (204 lb)   SpO2 97%   BMI 32.93 kg/m²     Physical Exam  Constitutional:       General: She is not in acute distress.     Appearance: Normal appearance. She is not toxic-appearing.   HENT:      Head: " Normocephalic and atraumatic.      Right Ear: External ear normal.      Left Ear: External ear normal.      Nose: Nose normal.      Mouth/Throat:      Mouth: Mucous membranes are moist.      Pharynx: Oropharynx is clear.      Comments: Missing just a few teeth.  Overall fair dentition  Eyes:      Extraocular Movements: Extraocular movements intact.      Conjunctiva/sclera: Conjunctivae normal.   Cardiovascular:      Rate and Rhythm: Normal rate and regular rhythm.      Pulses: Normal pulses.      Heart sounds: Murmur heard.   Pulmonary:      Effort: Pulmonary effort is normal.      Breath sounds: Normal breath sounds. No wheezing.   Abdominal:      General: Bowel sounds are normal. There is no distension.      Palpations: Abdomen is soft. There is no mass.      Tenderness: There is abdominal tenderness. There is no guarding or rebound.      Hernia: No hernia is present.      Comments: Positive for left lower quadrant abdominal tenderness   Musculoskeletal:         General: No swelling.      Cervical back: Normal range of motion and neck supple.      Comments: Patient ambulates with a cane   Skin:     General: Skin is warm and dry.      Comments: Venous staining to BLE. Varicose veins to BLE. Small skin lesion to left lower calf and right forearm; scaly and raised.     Neurological:      General: No focal deficit present.      Mental Status: She is alert and oriented to person, place, and time. Mental status is at baseline.      Cranial Nerves: No cranial nerve deficit.      Motor: No weakness.   Psychiatric:         Mood and Affect: Mood normal.         Behavior: Behavior normal.         Thought Content: Thought content normal.         Judgment: Judgment normal.         Assessment/Plan   Problem List Items Addressed This Visit             ICD-10-CM    Post-nasal drip R09.82     Patient agreeable to trial medicated nose spray for symptom control.  If no improvement of symptoms, consider ENT referral?         Relevant  Medications    azelastine-fluticasone (Dymista) 137-50 mcg/spray nasal spray    Diverticulitis - Primary K57.92     Will initiate course of oral antibiotics due to presumed diverticulitis flare.  Patient to continue on liquid diet and the only increase diet consistency as is tolerated.  Provider to be notified for any persistent or worsening abdominal/infection concerns.         Relevant Medications    amoxicillin-pot clavulanate (Augmentin) 875-125 mg tablet    Anxiety F41.9     Patient continues on low-dose lorazepam.  Informed consent and drug screening urine completed August, 2024.  Patient to notify provider for any persistent or worsening mood concerns.         Relevant Medications    LORazepam (Ativan) 0.5 mg tablet

## 2024-09-23 NOTE — ASSESSMENT & PLAN NOTE
Patient continues on low-dose lorazepam.  Informed consent and drug screening urine completed August, 2024.  Patient to notify provider for any persistent or worsening mood concerns.

## 2024-09-23 NOTE — ASSESSMENT & PLAN NOTE
Patient agreeable to trial medicated nose spray for symptom control.  If no improvement of symptoms, consider ENT referral?

## 2024-09-23 NOTE — ASSESSMENT & PLAN NOTE
Will initiate course of oral antibiotics due to presumed diverticulitis flare.  Patient to continue on liquid diet and the only increase diet consistency as is tolerated.  Provider to be notified for any persistent or worsening abdominal/infection concerns.

## 2024-10-08 ENCOUNTER — OFFICE VISIT (OUTPATIENT)
Dept: PRIMARY CARE | Facility: CLINIC | Age: 84
End: 2024-10-08
Payer: MEDICARE

## 2024-10-08 VITALS
BODY MASS INDEX: 33.49 KG/M2 | WEIGHT: 208.4 LBS | DIASTOLIC BLOOD PRESSURE: 70 MMHG | SYSTOLIC BLOOD PRESSURE: 130 MMHG | HEIGHT: 66 IN | HEART RATE: 58 BPM | OXYGEN SATURATION: 96 %

## 2024-10-08 DIAGNOSIS — Z00.00 ROUTINE ADULT HEALTH MAINTENANCE: ICD-10-CM

## 2024-10-08 DIAGNOSIS — Z23 NEED FOR VACCINATION: ICD-10-CM

## 2024-10-08 DIAGNOSIS — K57.92 DIVERTICULITIS: Primary | ICD-10-CM

## 2024-10-08 DIAGNOSIS — R09.82 POST-NASAL DRIP: ICD-10-CM

## 2024-10-08 DIAGNOSIS — Z95.0 CARDIAC PACEMAKER IN SITU: ICD-10-CM

## 2024-10-08 PROCEDURE — 99213 OFFICE O/P EST LOW 20 MIN: CPT | Performed by: NURSE PRACTITIONER

## 2024-10-08 PROCEDURE — G0008 ADMIN INFLUENZA VIRUS VAC: HCPCS | Performed by: NURSE PRACTITIONER

## 2024-10-08 PROCEDURE — 90662 IIV NO PRSV INCREASED AG IM: CPT | Performed by: NURSE PRACTITIONER

## 2024-10-08 PROCEDURE — 1160F RVW MEDS BY RX/DR IN RCRD: CPT | Performed by: NURSE PRACTITIONER

## 2024-10-08 PROCEDURE — 1159F MED LIST DOCD IN RCRD: CPT | Performed by: NURSE PRACTITIONER

## 2024-10-08 PROCEDURE — 1124F ACP DISCUSS-NO DSCNMKR DOCD: CPT | Performed by: NURSE PRACTITIONER

## 2024-10-08 PROCEDURE — 3078F DIAST BP <80 MM HG: CPT | Performed by: NURSE PRACTITIONER

## 2024-10-08 PROCEDURE — 3075F SYST BP GE 130 - 139MM HG: CPT | Performed by: NURSE PRACTITIONER

## 2024-10-08 ASSESSMENT — ENCOUNTER SYMPTOMS
BACK PAIN: 1
DIFFICULTY URINATING: 0
ABDOMINAL DISTENTION: 0
HEADACHES: 0
CONSTIPATION: 0
COLOR CHANGE: 0
FEVER: 0
EYE PAIN: 0
BRUISES/BLEEDS EASILY: 1
MYALGIAS: 0
TROUBLE SWALLOWING: 0
WHEEZING: 0
JOINT SWELLING: 0
SEIZURES: 0
NAUSEA: 0
CHILLS: 0
PALPITATIONS: 0
FATIGUE: 0
ARTHRALGIAS: 1
WOUND: 0
WEAKNESS: 0
ADENOPATHY: 0
SHORTNESS OF BREATH: 0
DIZZINESS: 0

## 2024-10-08 NOTE — PROGRESS NOTES
Subjective   Patient ID: Danisha Marsh is a 84 y.o. female who presents for Follow-up (Non resolving diverticulitis symptoms upset stomach and Fri she throw up ).    Patient seen today for follow-up of diverticulitis flare.  Patient seen sitting up in office, in no acute distress.  She is alert, oriented and appears in good spirits.  Patient reports that the Augmentin she took for diverticulitis made her nauseous so she only took it for 7 days.  Patient reports that she completed the Augmentin approximately a week ago and her stomach pain, bloating, diarrhea and nausea/vomiting have all significantly improved.  She continues to increase her diet slowly as tolerated and continues to stay hydrated.  No reported issues with fever, chills or other systemic concerns.  Patient admits to some continued issues with anxiety at nighttime which began after the loss of her .  She continues taking lorazepam low-dose prior to bedtime to help with anxiety/associated insomnia.  Chronic cough secondary to postnasal drip has improved with prescription nasal spray.  Patient reports that she needs to have her pacemaker battery replaced in the near future.  She voices concerns that this may be contributing to her fatigue.  Medications reviewed.  No other acute concerns voiced at this time.          Current Outpatient Medications on File Prior to Visit   Medication Sig Dispense Refill    acetaminophen (Tylenol) 325 mg tablet Take 2 tablets (650 mg) by mouth every 6 hours if needed for moderate pain (4 - 6).      amiodarone (Pacerone) 200 mg tablet Take 1 tablet (200 mg) by mouth early in the morning..      amLODIPine (Norvasc) 5 mg tablet Take 1 tablet (5 mg) by mouth every 12 hours.      azelastine-fluticasone (Dymista) 137-50 mcg/spray nasal spray Administer 1 spray into each nostril 2 times a day. Use in each nostril as directed 1 each 0    clindamycin (Cleocin) 300 mg capsule Take two capsules one hour before your dentist  appointment      diclofenac (Voltaren) 75 mg EC tablet Take 1 tablet (75 mg) by mouth 3 times a day as needed.      Eliquis 5 mg tablet Take 1 tablet (5 mg) by mouth every 12 hours.      guaiFENesin (Robitussin) 100 mg/5 mL syrup Take 10 mL (200 mg) by mouth 3 times a day as needed for cough.      indapamide (Lozol) 2.5 mg tablet Take 1 tablet (2.5 mg) by mouth once daily. 90 tablet 3    irbesartan (Avapro) 300 mg tablet Take 0.5 tablets (150 mg) by mouth 2 times a day. 90 tablet 3    LORazepam (Ativan) 0.5 mg tablet Take 1 tablet (0.5 mg) by mouth once daily at bedtime. 30 tablet 0    methIMAzole (Tapazole) 5 mg tablet 1 tab daily then 1 tab in the evening every other day 154 tablet 1    methocarbamol (Robaxin) 500 mg tablet Take 1 tablet (500 mg) by mouth 3 times a day.      metoprolol tartrate (Lopressor) 50 mg tablet Take 1 tablet by mouth every 12 hours. 180 tablet 1    nystatin (Mycostatin) 100,000 unit/gram powder Apply 1 Application topically 2 times a day. 60 g 2    nystatin (Mycostatin) cream Apply topically if needed (Applied to affected area twice daily as needed). 60 g 2    rosuvastatin (Crestor) 20 mg tablet Take 1 tablet (20 mg) by mouth once daily at bedtime.      [DISCONTINUED] amoxicillin-pot clavulanate (Augmentin) 875-125 mg tablet Take 1 tablet (875 mg) by mouth 2 times a day for 10 days. 20 tablet 0     No current facility-administered medications on file prior to visit.       Past Medical History:   Diagnosis Date    Allergic     Arthritis     Blood in stool 12/27/2022    Cancer (Multi) 2007    Cataract 2024    Disease of thyroid gland     Diverticulitis 12/27/2022    Eczema     Heart disease stent 2018    Hypertension     Inflammatory bowel disease     Urinary tract infection     Varicella     Visual impairment         Past Surgical History:   Procedure Laterality Date    BACK SURGERY  1973    CARDIAC CATHETERIZATION      CARDIAC SURGERY  ablasion & pace maker for afib    COLON SURGERY  2007  "   CORONARY STENT PLACEMENT  2018    HERNIA REPAIR  2007    JOINT REPLACEMENT  2019        Family History   Problem Relation Name Age of Onset    Cancer Mother Nereyda Yen     Heart disease Father Juanito Yen         Review of Systems   Constitutional:  Negative for chills, fatigue and fever.   HENT:  Negative for dental problem and trouble swallowing.    Eyes:  Negative for pain and visual disturbance.        Wears glasses.  Underwent bilateral cataract surgery   Respiratory:  Negative for cough, shortness of breath and wheezing.    Cardiovascular:  Negative for chest pain, palpitations and leg swelling.   Gastrointestinal:  Negative for abdominal distention, abdominal pain, constipation, diarrhea and nausea.        Positive for colon cancer history.     Endocrine: Negative for cold intolerance and heat intolerance.   Genitourinary:  Negative for difficulty urinating.   Musculoskeletal:  Positive for arthralgias, back pain and gait problem. Negative for joint swelling and myalgias.        Positive for chronic bilateral hip and lower back pain.    Skin:  Negative for color change, pallor, rash and wound.        Positive skin cancer history.   Allergic/Immunologic: Negative for environmental allergies and food allergies.   Neurological:  Negative for dizziness, seizures, weakness and headaches.   Hematological:  Negative for adenopathy. Bruises/bleeds easily.   Psychiatric/Behavioral:  Negative for behavioral problems.    All other systems reviewed and are negative.      Objective   /70   Pulse 58   Ht 1.676 m (5' 6\")   Wt 94.5 kg (208 lb 6.4 oz)   SpO2 96%   BMI 33.64 kg/m²     Physical Exam  Constitutional:       General: She is not in acute distress.     Appearance: Normal appearance. She is not toxic-appearing.   HENT:      Head: Normocephalic and atraumatic.      Right Ear: External ear normal.      Left Ear: External ear normal.      Nose: Nose normal.      Mouth/Throat:      Mouth: Mucous " membranes are moist.      Pharynx: Oropharynx is clear.      Comments: Missing just a few teeth.  Overall fair dentition  Eyes:      Extraocular Movements: Extraocular movements intact.      Conjunctiva/sclera: Conjunctivae normal.   Cardiovascular:      Rate and Rhythm: Regular rhythm. Bradycardia present.      Pulses: Normal pulses.      Heart sounds: Murmur heard.   Pulmonary:      Effort: Pulmonary effort is normal.      Breath sounds: Normal breath sounds. No wheezing.   Abdominal:      General: Bowel sounds are normal. There is no distension.      Palpations: Abdomen is soft. There is no mass.      Tenderness: There is no abdominal tenderness. There is no guarding or rebound.      Hernia: No hernia is present.   Musculoskeletal:         General: No swelling.      Cervical back: Normal range of motion and neck supple.      Comments: Patient ambulates with a cane   Skin:     General: Skin is warm and dry.      Comments: Venous staining to BLE. Varicose veins to BLE. Small skin lesion to left lower calf and right forearm; scaly and raised.     Neurological:      General: No focal deficit present.      Mental Status: She is alert and oriented to person, place, and time. Mental status is at baseline.      Cranial Nerves: No cranial nerve deficit.      Motor: No weakness.   Psychiatric:         Mood and Affect: Mood normal.         Behavior: Behavior normal.         Thought Content: Thought content normal.         Judgment: Judgment normal.         Assessment/Plan   Problem List Items Addressed This Visit             ICD-10-CM    Post-nasal drip R09.82     Improved on medicated nose spray for symptom control.           Diverticulitis - Primary K57.92     Patient's symptoms continue to improve.  She is to continue to advance her diet as tolerated.  Provider to be notified for any persistent or worsening abdominal/ infection concerns.  Augmentin added as an intolerance         Cardiac pacemaker in situ Z95.0      Patient reports pacemaker battery to be replaced in the near future.  Follow-up per cardiology recommendations         Routine adult health maintenance Z00.00     Routine labs ordered completed in July, 2024  -Patient follows routinely with gastroenterology for colonoscopies given history of colon cancer.  -Due to patient's age, no routine mammogram or pelvic exam indicated at this time          Other Visit Diagnoses         Codes    Need for vaccination     Z23    Relevant Orders    Flu vaccine, trivalent, preservative free, HIGH-DOSE, age 65y+ (Fluzone) (Completed)

## 2024-10-10 ASSESSMENT — ENCOUNTER SYMPTOMS
DIARRHEA: 0
ABDOMINAL PAIN: 0
COUGH: 0

## 2024-10-11 NOTE — ASSESSMENT & PLAN NOTE
Routine labs ordered completed in July, 2024  -Patient follows routinely with gastroenterology for colonoscopies given history of colon cancer.  -Due to patient's age, no routine mammogram or pelvic exam indicated at this time

## 2024-10-11 NOTE — ASSESSMENT & PLAN NOTE
Patient's symptoms continue to improve.  She is to continue to advance her diet as tolerated.  Provider to be notified for any persistent or worsening abdominal/ infection concerns.  Augmentin added as an intolerance

## 2024-10-11 NOTE — ASSESSMENT & PLAN NOTE
Patient reports pacemaker battery to be replaced in the near future.  Follow-up per cardiology recommendations

## 2024-10-14 ENCOUNTER — TELEPHONE (OUTPATIENT)
Dept: PRIMARY CARE | Facility: CLINIC | Age: 84
End: 2024-10-14
Payer: MEDICARE

## 2024-10-14 DIAGNOSIS — K57.92 DIVERTICULITIS: Primary | ICD-10-CM

## 2024-10-14 NOTE — TELEPHONE ENCOUNTER
Referral placed for gastroenterologist who is out of  [facility] which is located at [address] [place] [address] [facility], [facility] [address] [id number].  Can you please make sure that patient would be okay with traveling this far?  The only doctor that goes to [facility] is booked out until May.  And I am assuming this is for her diverticulitis?

## 2024-10-14 NOTE — TELEPHONE ENCOUNTER
Pt called in this AM to see if LOUISE KING could refer her GI / [facility]ive [facility] specialist in the [facility] area?

## 2024-10-16 ENCOUNTER — APPOINTMENT (OUTPATIENT)
Dept: ENDOCRINOLOGY | Facility: CLINIC | Age: 84
End: 2024-10-16
Payer: MEDICARE

## 2024-10-16 VITALS
DIASTOLIC BLOOD PRESSURE: 83 MMHG | WEIGHT: 201 LBS | HEART RATE: 57 BPM | BODY MASS INDEX: 32.44 KG/M2 | SYSTOLIC BLOOD PRESSURE: 142 MMHG

## 2024-10-16 DIAGNOSIS — E05.90 HYPERTHYROIDISM: Primary | ICD-10-CM

## 2024-10-16 ASSESSMENT — ENCOUNTER SYMPTOMS
ABDOMINAL PAIN: 0
NERVOUS/ANXIOUS: 0
UNEXPECTED WEIGHT CHANGE: 0
BACK PAIN: 1
PALPITATIONS: 0
TREMORS: 0
SHORTNESS OF BREATH: 0
FATIGUE: 0
HEADACHES: 0
NECK PAIN: 0
CONSTIPATION: 0
MYALGIAS: 1
NAUSEA: 0
TROUBLE SWALLOWING: 0
FEVER: 0
VOMITING: 0
DIARRHEA: 0
ARTHRALGIAS: 1
WEAKNESS: 0
COUGH: 1

## 2024-10-16 NOTE — PROGRESS NOTES
History Of Present Illness  Danisha Marsh is a 84 y.o. female with a 20-year history of hyperthyroidism    Hyperthyroidism diagnosed while on amiodarone for atrial fibrillation.   Amiodarone therapy continued at 200 mg/day    Methimazole 5 mg, alternating 5 mg BID every other day    Remote history of right thyroid nodule resected 2002 or 2003        Past Medical History  She has a past medical history of Allergic, Arthritis, Blood in stool (12/27/2022), Cancer (Multi) (2007), Cataract (2024), Disease of thyroid gland, Diverticulitis (12/27/2022), Eczema, Heart disease (stent 2018), Hypertension, Inflammatory bowel disease, Urinary tract infection, Varicella, and Visual impairment.    Surgical History  She has a past surgical history that includes Back surgery (1973); Cardiac catheterization; Colon surgery (2007); Coronary stent placement (2018); Cardiac surgery (ablasion & pace maker for afib); Hernia repair (2007); and Joint replacement (2019).     Social History  She reports that she quit smoking about 56 years ago. Her smoking use included cigarettes. She has a 2.5 pack-year smoking history. She has never used smokeless tobacco. She reports that she does not currently use alcohol. She reports that she does not currently use drugs.    Family History  Family History   Problem Relation Name Age of Onset    Cancer Mother Nereyda Yen     Heart disease Father Juanito Yen        Allergies  Shellfish containing products, Shellfish derived, Adhesive, Augmentin [amoxicillin-pot clavulanate], Diclofenac sodium, Erythromycin base, Oxycodone-acetaminophen, Ragweed, Adhesive tape-silicones, Carbamates, and Latex    Medications  Current Outpatient Medications   Medication Instructions    acetaminophen (TYLENOL) 650 mg, Every 6 hours PRN    amiodarone (Pacerone) 200 mg tablet 1 tablet, Daily (0630)    amLODIPine (NORVASC) 5 mg, Every 12 hours scheduled (0630,1830)    azelastine-fluticasone (Dymista) 137-50 mcg/spray nasal  spray 1 spray, Each Nostril, 2 times daily, Use in each nostril as directed    clindamycin (Cleocin) 300 mg capsule Take two capsules one hour before your dentist appointment    Eliquis 5 mg, Every 12 hours scheduled (0630,1830)    guaiFENesin (ROBITUSSIN) 200 mg, 3 times daily PRN    indapamide (LOZOL) 2.5 mg, oral, Daily    irbesartan (AVAPRO) 150 mg, oral, 2 times daily    LORazepam (ATIVAN) 0.5 mg, oral, Nightly    methIMAzole (Tapazole) 5 mg tablet 1 tab daily then 1 tab in the evening every other day    metoprolol tartrate (LOPRESSOR) 50 mg, oral, Every 12 hours scheduled (0630,1830)    nystatin (Mycostatin) 100,000 unit/gram powder 1 Application, Topical, 2 times daily    nystatin (Mycostatin) cream Topical, As needed    rosuvastatin (CRESTOR) 20 mg, Nightly           Review of Systems   Constitutional:  Negative for fatigue, fever and unexpected weight change.   HENT:  Negative for trouble swallowing.    Eyes:  Negative for visual disturbance.   Respiratory:  Positive for cough. Negative for shortness of breath.    Cardiovascular:  Negative for chest pain and palpitations.   Gastrointestinal:  Negative for abdominal pain, constipation, diarrhea, nausea and vomiting.   Endocrine: Negative for cold intolerance and heat intolerance.   Musculoskeletal:  Positive for arthralgias, back pain and myalgias. Negative for neck pain.   Skin:  Negative for rash.   Neurological:  Negative for tremors, weakness and headaches.   Psychiatric/Behavioral:  The patient is not nervous/anxious.          Last Recorded Vitals  Blood pressure 142/83, pulse 57, weight 91.2 kg (201 lb).    Physical Exam  Constitutional:       General: She is not in acute distress.  HENT:      Head: Normocephalic.      Mouth/Throat:      Mouth: Mucous membranes are moist.   Eyes:      Extraocular Movements: Extraocular movements intact.   Neck:      Thyroid: No thyroid mass.      Comments: Thyroid upper limit normal size, no nodule, mobile with  swallow.  Anterior neck surgical scar.   Cardiovascular:      Pulses:           Radial pulses are 2+ on the right side and 2+ on the left side.   Musculoskeletal:      Right lower leg: No edema.      Left lower leg: No edema.   Lymphadenopathy:      Cervical: No cervical adenopathy.   Neurological:      Mental Status: She is alert.      Motor: No tremor.   Psychiatric:         Mood and Affect: Affect normal.          Relevant Results  Component 04/10/24 08/09/23 10/18/22 11/05/21 06/22/21 06/09/21   TSH 2.560 1.780 1.550 1.560 2.590 1.800       US THYROID (7/8/24)  FINDINGS:          RIGHT LOBE:  6 x 2.7 x 3.6 cm. Lower pole TR 1 nodule measuring 17 x 12 x 15 mm.      LEFT LOBE:  5.7 x 2.6 x 2.2 cm. Upper pole TR 1 nodule measuring 8 x 7 x 7 mm.      ISTHMUS:  1.1 cm.      IMPRESSION:  Bilateral thyroid nodules as above.      IMPRESSION  HYPERTHYROIDISM  History of amiodarone induced hyperthyroidism  Stably euthyroid on low dose methimazole, with continued amiodarone  Remote history of surgery for thyroid nodules  Residual small nodules, at low risk for malignancy.       RECOMMENDATIONS  Continue methimazole 5 mg alternating with 10 mg every other day.   Draw TSH, free T4 with labs for ALBER Negro this winter.  Results available on [employer]  Call/message if you have not received results in 3 days.       If no change, follow up and repeat labs in June 2025.

## 2024-10-16 NOTE — LETTER
October 16, 2024     Julia Negro, APRN-CNP  [address]  [address]  [facility] [address]    Patient: Danisha Marsh   YOB: 1940   Date of Visit: 10/16/2024       Dear Dr. Julia Negro, APRN-CNP:    Thank you for referring Danisha Marsh to me for evaluation. Below are my notes for this consultation.  If you have questions, please do not hesitate to call me. I look forward to following your patient along with you.       Sincerely,     Golden Sauceda MD      CC: No Recipients  ______________________________________________________________________________________    History Of Present Illness  Danisha Marsh is a 84 y.o. female with a 20-year history of hyperthyroidism    Hyperthyroidism diagnosed while on amiodarone for atrial fibrillation.   Amiodarone therapy continued at 200 mg/day    Methimazole 5 mg, alternating 5 mg BID every other day    Remote history of right thyroid nodule resected 2002 or 2003        Past Medical History  She has a past medical history of Allergic, Arthritis, Blood in stool (12/27/2022), Cancer (Multi) (2007), Cataract (2024), Disease of thyroid gland, Diverticulitis (12/27/2022), Eczema, Heart disease (stent 2018), Hypertension, Inflammatory bowel disease, Urinary tract infection, Varicella, and Visual impairment.    Surgical History  She has a past surgical history that includes Back surgery (1973); Cardiac catheterization; Colon surgery (2007); Coronary stent placement (2018); Cardiac surgery (ablasion & pace maker for afib); Hernia repair (2007); and Joint replacement (2019).     Social History  She reports that she quit smoking about 56 years ago. Her smoking use included cigarettes. She has a 2.5 pack-year smoking history. She has never used smokeless tobacco. She reports that she does not currently use alcohol. She reports that she does not currently use drugs.    Family History  Family History   Problem Relation Name Age of Onset   • Cancer Mother Nereyda  Yovana    • Heart disease Father Juanito Yen        Allergies  Shellfish containing products, Shellfish derived, Adhesive, Augmentin [amoxicillin-pot clavulanate], Diclofenac sodium, Erythromycin base, Oxycodone-acetaminophen, Ragweed, Adhesive tape-silicones, Carbamates, and Latex    Medications  Current Outpatient Medications   Medication Instructions   • acetaminophen (TYLENOL) 650 mg, Every 6 hours PRN   • amiodarone (Pacerone) 200 mg tablet 1 tablet, Daily (0630)   • amLODIPine (NORVASC) 5 mg, Every 12 hours scheduled (0630,1830)   • azelastine-fluticasone (Dymista) 137-50 mcg/spray nasal spray 1 spray, Each Nostril, 2 times daily, Use in each nostril as directed   • clindamycin (Cleocin) 300 mg capsule Take two capsules one hour before your dentist appointment   • Eliquis 5 mg, Every 12 hours scheduled (0630,1830)   • guaiFENesin (ROBITUSSIN) 200 mg, 3 times daily PRN   • indapamide (LOZOL) 2.5 mg, oral, Daily   • irbesartan (AVAPRO) 150 mg, oral, 2 times daily   • LORazepam (ATIVAN) 0.5 mg, oral, Nightly   • methIMAzole (Tapazole) 5 mg tablet 1 tab daily then 1 tab in the evening every other day   • metoprolol tartrate (LOPRESSOR) 50 mg, oral, Every 12 hours scheduled (0630,1830)   • nystatin (Mycostatin) 100,000 unit/gram powder 1 Application, Topical, 2 times daily   • nystatin (Mycostatin) cream Topical, As needed   • rosuvastatin (CRESTOR) 20 mg, Nightly           Review of Systems   Constitutional:  Negative for fatigue, fever and unexpected weight change.   HENT:  Negative for trouble swallowing.    Eyes:  Negative for visual disturbance.   Respiratory:  Positive for cough. Negative for shortness of breath.    Cardiovascular:  Negative for chest pain and palpitations.   Gastrointestinal:  Negative for abdominal pain, constipation, diarrhea, nausea and vomiting.   Endocrine: Negative for cold intolerance and heat intolerance.   Musculoskeletal:  Positive for arthralgias, back pain and myalgias.  Negative for neck pain.   Skin:  Negative for rash.   Neurological:  Negative for tremors, weakness and headaches.   Psychiatric/Behavioral:  The patient is not nervous/anxious.          Last Recorded Vitals  Blood pressure 142/83, pulse 57, weight 91.2 kg (201 lb).    Physical Exam  Constitutional:       General: She is not in acute distress.  HENT:      Head: Normocephalic.      Mouth/Throat:      Mouth: Mucous membranes are moist.   Eyes:      Extraocular Movements: Extraocular movements intact.   Neck:      Thyroid: No thyroid mass.      Comments: Thyroid upper limit normal size, no nodule, mobile with swallow.  Anterior neck surgical scar.   Cardiovascular:      Pulses:           Radial pulses are 2+ on the right side and 2+ on the left side.   Musculoskeletal:      Right lower leg: No edema.      Left lower leg: No edema.   Lymphadenopathy:      Cervical: No cervical adenopathy.   Neurological:      Mental Status: She is alert.      Motor: No tremor.   Psychiatric:         Mood and Affect: Affect normal.          Relevant Results  Component 04/10/24 08/09/23 10/18/22 11/05/21 06/22/21 06/09/21   TSH 2.560 1.780 1.550 1.560 2.590 1.800       US THYROID (7/8/24)  FINDINGS:          RIGHT LOBE:  6 x 2.7 x 3.6 cm. Lower pole TR 1 nodule measuring 17 x 12 x 15 mm.      LEFT LOBE:  5.7 x 2.6 x 2.2 cm. Upper pole TR 1 nodule measuring 8 x 7 x 7 mm.      ISTHMUS:  1.1 cm.      IMPRESSION:  Bilateral thyroid nodules as above.      IMPRESSION  HYPERTHYROIDISM  History of amiodarone induced hyperthyroidism  Stably euthyroid on low dose methimazole, with continued amiodarone  Remote history of surgery for thyroid nodules  Residual small nodules, at low risk for malignancy.       RECOMMENDATIONS  Continue methimazole 5 mg alternating with 10 mg every other day.   Draw TSH, free T4 with labs for ALBER Negro this winter.  Results available on [employer]  Call/message if you have not received results in 3 days.       If no change,  follow up and repeat labs in June 2025.

## 2024-10-16 NOTE — PATIENT INSTRUCTIONS
RECOMMENDATIONS  Continue methimazole 5 mg alternating with 10 mg every other day.   Draw TSH, free T4 with labs for ALBER Negro this winter.  Results available on [employer]  Call/message if you have not received results in 3 days.       If no change, follow up and repeat labs in June 2025.

## 2024-10-22 ENCOUNTER — TELEPHONE (OUTPATIENT)
Dept: PRIMARY CARE | Facility: CLINIC | Age: 84
End: 2024-10-22
Payer: MEDICARE

## 2024-10-22 DIAGNOSIS — F41.9 ANXIETY: ICD-10-CM

## 2024-10-22 RX ORDER — LORAZEPAM 0.5 MG/1
0.5 TABLET ORAL NIGHTLY
Qty: 30 TABLET | Refills: 0 | Status: SHIPPED | OUTPATIENT
Start: 2024-10-22 | End: 2024-11-21

## 2024-11-04 DIAGNOSIS — I10 BENIGN ESSENTIAL HYPERTENSION: ICD-10-CM

## 2024-11-04 RX ORDER — METOPROLOL TARTRATE 50 MG/1
50 TABLET ORAL
Qty: 180 TABLET | Refills: 1 | Status: SHIPPED | OUTPATIENT
Start: 2024-11-04

## 2024-11-21 ENCOUNTER — TELEPHONE (OUTPATIENT)
Dept: PRIMARY CARE | Facility: CLINIC | Age: 84
End: 2024-11-21
Payer: MEDICARE

## 2024-11-21 DIAGNOSIS — F41.9 ANXIETY: ICD-10-CM

## 2024-11-21 RX ORDER — LORAZEPAM 0.5 MG/1
0.5 TABLET ORAL NIGHTLY
Qty: 30 TABLET | Refills: 0 | Status: SHIPPED | OUTPATIENT
Start: 2024-11-21 | End: 2024-12-21

## 2024-12-02 ENCOUNTER — APPOINTMENT (OUTPATIENT)
Dept: PRIMARY CARE | Facility: CLINIC | Age: 84
End: 2024-12-02
Payer: MEDICARE

## 2024-12-09 ENCOUNTER — LAB (OUTPATIENT)
Dept: LAB | Facility: LAB | Age: 84
End: 2024-12-09
Payer: MEDICARE

## 2024-12-09 ENCOUNTER — OFFICE VISIT (OUTPATIENT)
Dept: PRIMARY CARE | Facility: CLINIC | Age: 84
End: 2024-12-09
Payer: MEDICARE

## 2024-12-09 VITALS
HEART RATE: 58 BPM | HEIGHT: 66 IN | DIASTOLIC BLOOD PRESSURE: 74 MMHG | WEIGHT: 201 LBS | BODY MASS INDEX: 32.3 KG/M2 | SYSTOLIC BLOOD PRESSURE: 134 MMHG | OXYGEN SATURATION: 97 %

## 2024-12-09 DIAGNOSIS — K64.9 BLEEDING HEMORRHOID: ICD-10-CM

## 2024-12-09 DIAGNOSIS — I10 BENIGN ESSENTIAL HYPERTENSION: ICD-10-CM

## 2024-12-09 DIAGNOSIS — Z00.00 ROUTINE ADULT HEALTH MAINTENANCE: ICD-10-CM

## 2024-12-09 DIAGNOSIS — H72.91 ACUTE OTITIS MEDIA OF RIGHT EAR WITH PERFORATION: Primary | ICD-10-CM

## 2024-12-09 DIAGNOSIS — Z95.0 CARDIAC PACEMAKER IN SITU: ICD-10-CM

## 2024-12-09 DIAGNOSIS — H66.91 ACUTE OTITIS MEDIA OF RIGHT EAR WITH PERFORATION: Primary | ICD-10-CM

## 2024-12-09 DIAGNOSIS — R79.89 OTHER SPECIFIED ABNORMAL FINDINGS OF BLOOD CHEMISTRY: ICD-10-CM

## 2024-12-09 PROBLEM — M70.50 PES ANSERINE BURSITIS: Status: RESOLVED | Noted: 2023-08-15 | Resolved: 2024-12-09

## 2024-12-09 PROBLEM — R07.89 OTHER CHEST PAIN: Status: RESOLVED | Noted: 2021-06-09 | Resolved: 2024-12-09

## 2024-12-09 PROBLEM — M25.551 RIGHT HIP PAIN: Status: RESOLVED | Noted: 2022-11-01 | Resolved: 2024-12-09

## 2024-12-09 PROBLEM — R10.9 ABDOMINAL PAIN: Status: RESOLVED | Noted: 2022-12-28 | Resolved: 2024-12-09

## 2024-12-09 PROBLEM — M25.562 CHRONIC PAIN OF LEFT KNEE: Status: RESOLVED | Noted: 2019-01-07 | Resolved: 2024-12-09

## 2024-12-09 PROBLEM — G89.29 CHRONIC PAIN OF LEFT KNEE: Status: RESOLVED | Noted: 2019-01-07 | Resolved: 2024-12-09

## 2024-12-09 LAB
BASOPHILS # BLD AUTO: 0.06 X10*3/UL (ref 0–0.1)
BASOPHILS NFR BLD AUTO: 0.4 %
EOSINOPHIL # BLD AUTO: 0.03 X10*3/UL (ref 0–0.4)
EOSINOPHIL NFR BLD AUTO: 0.2 %
ERYTHROCYTE [DISTWIDTH] IN BLOOD BY AUTOMATED COUNT: 13.9 % (ref 11.5–14.5)
FERRITIN SERPL-MCNC: 101 NG/ML (ref 8–150)
HCT VFR BLD AUTO: 43.6 % (ref 36–46)
HGB BLD-MCNC: 14.2 G/DL (ref 12–16)
IMM GRANULOCYTES # BLD AUTO: 0.09 X10*3/UL (ref 0–0.5)
IMM GRANULOCYTES NFR BLD AUTO: 0.7 % (ref 0–0.9)
IRON SATN MFR SERPL: 20 % (ref 25–45)
IRON SERPL-MCNC: 72 UG/DL (ref 35–150)
LYMPHOCYTES # BLD AUTO: 1.31 X10*3/UL (ref 0.8–3)
LYMPHOCYTES NFR BLD AUTO: 9.7 %
MCH RBC QN AUTO: 30.7 PG (ref 26–34)
MCHC RBC AUTO-ENTMCNC: 32.6 G/DL (ref 32–36)
MCV RBC AUTO: 94 FL (ref 80–100)
MONOCYTES # BLD AUTO: 1.76 X10*3/UL (ref 0.05–0.8)
MONOCYTES NFR BLD AUTO: 13 %
NEUTROPHILS # BLD AUTO: 10.32 X10*3/UL (ref 1.6–5.5)
NEUTROPHILS NFR BLD AUTO: 76 %
NRBC BLD-RTO: 0 /100 WBCS (ref 0–0)
PLATELET # BLD AUTO: 297 X10*3/UL (ref 150–450)
RBC # BLD AUTO: 4.63 X10*6/UL (ref 4–5.2)
TIBC SERPL-MCNC: 354 UG/DL (ref 240–445)
UIBC SERPL-MCNC: 282 UG/DL (ref 110–370)
VIT B12 SERPL-MCNC: 419 PG/ML (ref 211–911)
WBC # BLD AUTO: 13.6 X10*3/UL (ref 4.4–11.3)

## 2024-12-09 PROCEDURE — 82728 ASSAY OF FERRITIN: CPT

## 2024-12-09 PROCEDURE — 1160F RVW MEDS BY RX/DR IN RCRD: CPT | Performed by: NURSE PRACTITIONER

## 2024-12-09 PROCEDURE — 3075F SYST BP GE 130 - 139MM HG: CPT | Performed by: NURSE PRACTITIONER

## 2024-12-09 PROCEDURE — 3078F DIAST BP <80 MM HG: CPT | Performed by: NURSE PRACTITIONER

## 2024-12-09 PROCEDURE — G2211 COMPLEX E/M VISIT ADD ON: HCPCS | Performed by: NURSE PRACTITIONER

## 2024-12-09 PROCEDURE — 83550 IRON BINDING TEST: CPT

## 2024-12-09 PROCEDURE — 36415 COLL VENOUS BLD VENIPUNCTURE: CPT

## 2024-12-09 PROCEDURE — 99213 OFFICE O/P EST LOW 20 MIN: CPT | Performed by: NURSE PRACTITIONER

## 2024-12-09 PROCEDURE — 85025 COMPLETE CBC W/AUTO DIFF WBC: CPT

## 2024-12-09 PROCEDURE — 82607 VITAMIN B-12: CPT

## 2024-12-09 PROCEDURE — 83540 ASSAY OF IRON: CPT

## 2024-12-09 PROCEDURE — 1159F MED LIST DOCD IN RCRD: CPT | Performed by: NURSE PRACTITIONER

## 2024-12-09 PROCEDURE — 1036F TOBACCO NON-USER: CPT | Performed by: NURSE PRACTITIONER

## 2024-12-09 RX ORDER — PENICILLIN V POTASSIUM 500 MG/1
2000 TABLET, FILM COATED ORAL
COMMUNITY
Start: 2024-11-18

## 2024-12-09 RX ORDER — AMOXICILLIN AND CLAVULANATE POTASSIUM 875; 125 MG/1; MG/1
875 TABLET, FILM COATED ORAL 2 TIMES DAILY
Qty: 14 TABLET | Refills: 0 | Status: SHIPPED | OUTPATIENT
Start: 2024-12-09 | End: 2024-12-16

## 2024-12-09 RX ORDER — HYDROCORTISONE ACETATE PRAMOXINE HCL 2.5; 1 G/100G; G/100G
CREAM TOPICAL 2 TIMES DAILY
Qty: 30 G | Refills: 1 | Status: SHIPPED | OUTPATIENT
Start: 2024-12-09 | End: 2024-12-19

## 2024-12-09 ASSESSMENT — ENCOUNTER SYMPTOMS
CHILLS: 0
HEADACHES: 0
ABDOMINAL PAIN: 0
ADENOPATHY: 0
COLOR CHANGE: 0
BACK PAIN: 1
SEIZURES: 0
DIZZINESS: 0
ABDOMINAL DISTENTION: 0
WHEEZING: 0
JOINT SWELLING: 0
FEVER: 0
NAUSEA: 0
WEAKNESS: 0
TROUBLE SWALLOWING: 0
WOUND: 0
MYALGIAS: 0
PALPITATIONS: 0
FATIGUE: 0
SHORTNESS OF BREATH: 0
DIFFICULTY URINATING: 0
CONSTIPATION: 0
BRUISES/BLEEDS EASILY: 1
COUGH: 1
DIARRHEA: 0
EYE PAIN: 0
ARTHRALGIAS: 1

## 2024-12-09 NOTE — ASSESSMENT & PLAN NOTE
Will initiate course of topical steroids for symptomatic relief.  Patient has follow-up in place with GI for next month.  Provider to be notified for any persistent/worsening bleeding concerns

## 2024-12-09 NOTE — ASSESSMENT & PLAN NOTE
Will initiate course of oral antibiotics.  Patient instructed NOT to Q-tips or put any other objects into her ear canal.  Provider to be notified for any persistent/worsening ear concerns.  Patient voiced understanding

## 2024-12-09 NOTE — ASSESSMENT & PLAN NOTE
Most recent blood work reviewed.  Will continue to monitor as appropriate  -Patient follows routinely with gastroenterology for colonoscopies given history of colon cancer.  -Due to patient's age, no routine mammogram or pelvic exam indicated at this time

## 2024-12-09 NOTE — PROGRESS NOTES
Subjective   Patient ID: Danisha Marsh is a 84 y.o. female who presents for Otitis Media ( Right Ear ache blood in ear 3 days pt did have ear drops from her last time having ear aches ) and Hemorrhoids.    Patient seen today due to acute right ear concerns.  She states that her right ear began to bother her several days ago so she started to utilize prescription antibiotic drops from urgent care which she received earlier this year.  She states that her ear was very painful and then one day after showering she noticed some bleeding.  She put a Q-tip in her ear, noticed some increased wax and additional blood.  Patient states the pain no longer persisted but she had some associated productive cough with yellow sputum.  She denies any fever, chills or other systemic concerns.  No reported issues with appetite, staying hydrated, bowel or bladder.  Patient also reports recurrent issues with bleeding hemorrhoids.  She has been utilizing over-the-counter hydrocortisone cream with limited success.  Follow-up in place with GI for next month.  Patient denies any issues with lightheaded or dizziness.  She states that she has been dealing with bleeding hemorrhoids for the past year and a half.  She states that she will forget she has them at times but then they will begin to flare.  Patient is doing her best to follow a soft diet and not to strain with bowel movements.  Also discussed with patient plans for pacemaker battery replacement next month.  She denies any current cardiac concerns.  Patient appears to be overall in good spirits and has family visiting with her for the holidays.  Medications reviewed.  No other acute concerns voiced at this time.               Current Outpatient Medications on File Prior to Visit   Medication Sig Dispense Refill    acetaminophen (Tylenol) 325 mg tablet Take 2 tablets (650 mg) by mouth every 6 hours if needed for moderate pain (4 - 6).      amiodarone (Pacerone) 200 mg tablet Take 1  tablet (200 mg) by mouth early in the morning..      amLODIPine (Norvasc) 5 mg tablet Take 1 tablet (5 mg) by mouth every 12 hours.      azelastine-fluticasone (Dymista) 137-50 mcg/spray nasal spray Administer 1 spray into each nostril 2 times a day. Use in each nostril as directed 1 each 0    clindamycin (Cleocin) 300 mg capsule Take two capsules one hour before your dentist appointment      Eliquis 5 mg tablet Take 1 tablet (5 mg) by mouth every 12 hours.      guaiFENesin (Robitussin) 100 mg/5 mL syrup Take 10 mL (200 mg) by mouth 3 times a day as needed for cough.      indapamide (Lozol) 2.5 mg tablet Take 1 tablet (2.5 mg) by mouth once daily. 90 tablet 3    irbesartan (Avapro) 300 mg tablet Take 0.5 tablets (150 mg) by mouth 2 times a day. 90 tablet 3    LORazepam (Ativan) 0.5 mg tablet Take 1 tablet (0.5 mg) by mouth once daily at bedtime. 30 tablet 0    methIMAzole (Tapazole) 5 mg tablet 1 tab daily then 1 tab in the evening every other day 154 tablet 1    metoprolol tartrate (Lopressor) 50 mg tablet Take 1 tablet by mouth every 12 hours. 180 tablet 1    nystatin (Mycostatin) 100,000 unit/gram powder Apply 1 Application topically 2 times a day. 60 g 2    nystatin (Mycostatin) cream Apply topically if needed (Applied to affected area twice daily as needed). 60 g 2    penicillin v potassium (Veetid) 500 mg tablet Take 4 tablets (2,000 mg) by mouth. TAKE 4 TABLETS 1 HOUR PRIOR TO PROCEDURE, 2 TABLETS POST PROCEDURE      rosuvastatin (Crestor) 20 mg tablet Take 1 tablet (20 mg) by mouth once daily at bedtime.       No current facility-administered medications on file prior to visit.       Past Medical History:   Diagnosis Date    Allergic     Arthritis     Blood in stool 12/27/2022    Cancer (Multi) 2007    Cataract 2024    Disease of thyroid gland     Diverticulitis 12/27/2022    Eczema     Heart disease stent 2018    Hypertension     Inflammatory bowel disease     Urinary tract infection     Varicella      "Visual impairment         Past Surgical History:   Procedure Laterality Date    BACK SURGERY  1973    CARDIAC CATHETERIZATION      CARDIAC SURGERY  ablasion & pace maker for afib    COLON SURGERY  2007    CORONARY STENT PLACEMENT  2018    HERNIA REPAIR  2007    JOINT REPLACEMENT  2019        Family History   Problem Relation Name Age of Onset    Cancer Mother Nereyda Yen     Heart disease Father Juanito Yen         Review of Systems   Constitutional:  Negative for chills, fatigue and fever.   HENT:  Positive for congestion, ear discharge and ear pain. Negative for dental problem and trouble swallowing.         See HPI   Eyes:  Negative for pain and visual disturbance.        Wears glasses.  Underwent bilateral cataract surgery   Respiratory:  Positive for cough. Negative for shortness of breath and wheezing.         See HPI   Cardiovascular:  Negative for chest pain, palpitations and leg swelling.   Gastrointestinal:  Negative for abdominal distention, abdominal pain, constipation, diarrhea and nausea.        Positive for colon cancer history.     Endocrine: Negative for cold intolerance and heat intolerance.   Genitourinary:  Negative for difficulty urinating.   Musculoskeletal:  Positive for arthralgias, back pain and gait problem. Negative for joint swelling and myalgias.        Positive for chronic bilateral hip and lower back pain.    Skin:  Negative for color change, pallor, rash and wound.        Positive skin cancer history.   Allergic/Immunologic: Negative for environmental allergies and food allergies.   Neurological:  Negative for dizziness, seizures, weakness and headaches.   Hematological:  Negative for adenopathy. Bruises/bleeds easily.   Psychiatric/Behavioral:  Negative for behavioral problems.    All other systems reviewed and are negative.      Objective   /74   Pulse 58   Ht 1.676 m (5' 6\")   Wt 91.2 kg (201 lb)   SpO2 97%   BMI 32.44 kg/m²     Physical Exam  Constitutional:       " General: She is not in acute distress.     Appearance: Normal appearance. She is not toxic-appearing.   HENT:      Head: Normocephalic and atraumatic.      Right Ear: External ear normal. Tympanic membrane is perforated.      Left Ear: Tympanic membrane, ear canal and external ear normal.      Ears:      Comments: Positive for perforated right eardrum and small laceration to right ear canal     Nose: Nose normal.      Mouth/Throat:      Mouth: Mucous membranes are moist.      Pharynx: Oropharynx is clear.      Comments: Missing just a few teeth.  Overall fair dentition  Eyes:      Extraocular Movements: Extraocular movements intact.      Conjunctiva/sclera: Conjunctivae normal.   Cardiovascular:      Rate and Rhythm: Regular rhythm. Bradycardia present.      Pulses: Normal pulses.      Heart sounds: Murmur heard.   Pulmonary:      Effort: Pulmonary effort is normal.      Breath sounds: Normal breath sounds. No wheezing.   Musculoskeletal:         General: No swelling.      Cervical back: Normal range of motion and neck supple.      Comments: Patient ambulates with a cane   Skin:     General: Skin is warm and dry.      Comments: Venous staining to BLE. Varicose veins to BLE. Small skin lesion to left lower calf and right forearm; scaly and raised.     Neurological:      General: No focal deficit present.      Mental Status: She is alert and oriented to person, place, and time. Mental status is at baseline.      Cranial Nerves: No cranial nerve deficit.      Motor: No weakness.   Psychiatric:         Mood and Affect: Mood normal.         Behavior: Behavior normal.         Thought Content: Thought content normal.         Judgment: Judgment normal.         Assessment/Plan   Problem List Items Addressed This Visit             ICD-10-CM    Cardiac pacemaker in situ Z95.0     Battery replacement planned for next month per Dr. Blanco, EP cardiologist.          Benign essential hypertension I10     Relatively stable.  She  is to continue to monitor her blood pressure at home and notify provider for any persistent issues with hyper or hypotension.  She is also to notify provider for any new cardiac concerns.  Patient to maintain routine follow-up with cardiology.         Relevant Orders    CBC and Auto Differential    Iron and TIBC    Vitamin B12    Ferritin    Routine adult health maintenance Z00.00     Most recent blood work reviewed.  Will continue to monitor as appropriate  -Patient follows routinely with gastroenterology for colonoscopies given history of colon cancer.  -Due to patient's age, no routine mammogram or pelvic exam indicated at this time         Acute otitis media of right ear with perforation - Primary H66.91, H72.91     Will initiate course of oral antibiotics.  Patient instructed NOT to Q-tips or put any other objects into her ear canal.  Provider to be notified for any persistent/worsening ear concerns.  Patient voiced understanding         Relevant Medications    amoxicillin-pot clavulanate (Augmentin) 875-125 mg tablet    Bleeding hemorrhoid K64.9     Will initiate course of topical steroids for symptomatic relief.  Patient has follow-up in place with GI for next month.  Provider to be notified for any persistent/worsening bleeding concerns         Relevant Medications    hydrocortisone-pramoxine (Analpram-HC) 2.5-1 % cream    Other Relevant Orders    CBC and Auto Differential    Iron and TIBC    Vitamin B12    Ferritin     Other Visit Diagnoses         Codes    Other specified abnormal findings of blood chemistry     R79.89    Relevant Orders    Iron and TIBC    Ferritin

## 2024-12-09 NOTE — ASSESSMENT & PLAN NOTE
Relatively stable.  She is to continue to monitor her blood pressure at home and notify provider for any persistent issues with hyper or hypotension.  She is also to notify provider for any new cardiac concerns.  Patient to maintain routine follow-up with cardiology.   Quality 130: Documentation Of Current Medications In The Medical Record: Current Medications Documented Detail Level: Detailed Quality 431: Preventive Care And Screening: Unhealthy Alcohol Use - Screening: Patient not identified as an unhealthy alcohol user when screened for unhealthy alcohol use using a systematic screening method Quality 226: Preventive Care And Screening: Tobacco Use: Screening And Cessation Intervention: Patient screened for tobacco use and is an ex/non-smoker

## 2024-12-15 ENCOUNTER — HOSPITAL ENCOUNTER (EMERGENCY)
Facility: HOSPITAL | Age: 84
Discharge: HOME | End: 2024-12-15
Attending: STUDENT IN AN ORGANIZED HEALTH CARE EDUCATION/TRAINING PROGRAM
Payer: MEDICARE

## 2024-12-15 ENCOUNTER — APPOINTMENT (OUTPATIENT)
Dept: RADIOLOGY | Facility: HOSPITAL | Age: 84
End: 2024-12-15
Payer: MEDICARE

## 2024-12-15 VITALS
BODY MASS INDEX: 32.14 KG/M2 | DIASTOLIC BLOOD PRESSURE: 75 MMHG | HEART RATE: 71 BPM | HEIGHT: 66 IN | OXYGEN SATURATION: 98 % | WEIGHT: 200 LBS | SYSTOLIC BLOOD PRESSURE: 148 MMHG | RESPIRATION RATE: 16 BRPM | TEMPERATURE: 96.8 F

## 2024-12-15 DIAGNOSIS — K57.92 DIVERTICULITIS: Primary | ICD-10-CM

## 2024-12-15 LAB
ALBUMIN SERPL BCP-MCNC: 4 G/DL (ref 3.4–5)
ALP SERPL-CCNC: 93 U/L (ref 33–136)
ALT SERPL W P-5'-P-CCNC: 41 U/L (ref 7–45)
ANION GAP SERPL CALC-SCNC: 15 MMOL/L (ref 10–20)
APPEARANCE UR: CLEAR
AST SERPL W P-5'-P-CCNC: 27 U/L (ref 9–39)
BASOPHILS # BLD AUTO: 0.03 X10*3/UL (ref 0–0.1)
BASOPHILS NFR BLD AUTO: 0.2 %
BILIRUB SERPL-MCNC: 1.1 MG/DL (ref 0–1.2)
BILIRUB UR STRIP.AUTO-MCNC: NEGATIVE MG/DL
BUN SERPL-MCNC: 15 MG/DL (ref 6–23)
CALCIUM SERPL-MCNC: 9.1 MG/DL (ref 8.6–10.3)
CHLORIDE SERPL-SCNC: 88 MMOL/L (ref 98–107)
CO2 SERPL-SCNC: 26 MMOL/L (ref 21–32)
COLOR UR: NORMAL
CREAT SERPL-MCNC: 0.74 MG/DL (ref 0.5–1.05)
EGFRCR SERPLBLD CKD-EPI 2021: 81 ML/MIN/1.73M*2
EOSINOPHIL # BLD AUTO: 0.01 X10*3/UL (ref 0–0.4)
EOSINOPHIL NFR BLD AUTO: 0.1 %
ERYTHROCYTE [DISTWIDTH] IN BLOOD BY AUTOMATED COUNT: 13.5 % (ref 11.5–14.5)
GLUCOSE SERPL-MCNC: 95 MG/DL (ref 74–99)
GLUCOSE UR STRIP.AUTO-MCNC: NORMAL MG/DL
HCT VFR BLD AUTO: 40.8 % (ref 36–46)
HGB BLD-MCNC: 14.2 G/DL (ref 12–16)
IMM GRANULOCYTES # BLD AUTO: 0.06 X10*3/UL (ref 0–0.5)
IMM GRANULOCYTES NFR BLD AUTO: 0.5 % (ref 0–0.9)
KETONES UR STRIP.AUTO-MCNC: NEGATIVE MG/DL
LEUKOCYTE ESTERASE UR QL STRIP.AUTO: NEGATIVE
LIPASE SERPL-CCNC: 19 U/L (ref 9–82)
LYMPHOCYTES # BLD AUTO: 1.03 X10*3/UL (ref 0.8–3)
LYMPHOCYTES NFR BLD AUTO: 8 %
MAGNESIUM SERPL-MCNC: 1.71 MG/DL (ref 1.6–2.4)
MCH RBC QN AUTO: 31.6 PG (ref 26–34)
MCHC RBC AUTO-ENTMCNC: 34.8 G/DL (ref 32–36)
MCV RBC AUTO: 91 FL (ref 80–100)
MONOCYTES # BLD AUTO: 1.1 X10*3/UL (ref 0.05–0.8)
MONOCYTES NFR BLD AUTO: 8.6 %
MUCOUS THREADS #/AREA URNS AUTO: ABNORMAL /LPF
NEUTROPHILS # BLD AUTO: 10.6 X10*3/UL (ref 1.6–5.5)
NEUTROPHILS NFR BLD AUTO: 82.6 %
NITRITE UR QL STRIP.AUTO: NEGATIVE
NRBC BLD-RTO: 0 /100 WBCS (ref 0–0)
PH UR STRIP.AUTO: 7 [PH]
PLATELET # BLD AUTO: 232 X10*3/UL (ref 150–450)
POTASSIUM SERPL-SCNC: 4.1 MMOL/L (ref 3.5–5.3)
PROT SERPL-MCNC: 7.1 G/DL (ref 6.4–8.2)
PROT UR STRIP.AUTO-MCNC: NORMAL MG/DL
RBC # BLD AUTO: 4.49 X10*6/UL (ref 4–5.2)
RBC # UR STRIP.AUTO: NEGATIVE /UL
RBC #/AREA URNS AUTO: ABNORMAL /HPF
SODIUM SERPL-SCNC: 125 MMOL/L (ref 136–145)
SP GR UR STRIP.AUTO: 1.03
SQUAMOUS #/AREA URNS AUTO: ABNORMAL /HPF
UROBILINOGEN UR STRIP.AUTO-MCNC: NORMAL MG/DL
WBC # BLD AUTO: 12.8 X10*3/UL (ref 4.4–11.3)
WBC #/AREA URNS AUTO: ABNORMAL /HPF
YEAST BUDDING #/AREA UR COMP ASSIST: PRESENT /HPF

## 2024-12-15 PROCEDURE — 99285 EMERGENCY DEPT VISIT HI MDM: CPT | Mod: 25 | Performed by: STUDENT IN AN ORGANIZED HEALTH CARE EDUCATION/TRAINING PROGRAM

## 2024-12-15 PROCEDURE — 83690 ASSAY OF LIPASE: CPT

## 2024-12-15 PROCEDURE — 74177 CT ABD & PELVIS W/CONTRAST: CPT

## 2024-12-15 PROCEDURE — 85025 COMPLETE CBC W/AUTO DIFF WBC: CPT

## 2024-12-15 PROCEDURE — 80053 COMPREHEN METABOLIC PANEL: CPT

## 2024-12-15 PROCEDURE — 2550000001 HC RX 255 CONTRASTS: Performed by: STUDENT IN AN ORGANIZED HEALTH CARE EDUCATION/TRAINING PROGRAM

## 2024-12-15 PROCEDURE — 96375 TX/PRO/DX INJ NEW DRUG ADDON: CPT

## 2024-12-15 PROCEDURE — 81001 URINALYSIS AUTO W/SCOPE: CPT

## 2024-12-15 PROCEDURE — 74177 CT ABD & PELVIS W/CONTRAST: CPT | Mod: FOREIGN READ | Performed by: RADIOLOGY

## 2024-12-15 PROCEDURE — 83735 ASSAY OF MAGNESIUM: CPT

## 2024-12-15 PROCEDURE — 36415 COLL VENOUS BLD VENIPUNCTURE: CPT

## 2024-12-15 PROCEDURE — 96374 THER/PROPH/DIAG INJ IV PUSH: CPT | Mod: 59

## 2024-12-15 PROCEDURE — 96361 HYDRATE IV INFUSION ADD-ON: CPT

## 2024-12-15 PROCEDURE — 2500000004 HC RX 250 GENERAL PHARMACY W/ HCPCS (ALT 636 FOR OP/ED)

## 2024-12-15 RX ORDER — ONDANSETRON 4 MG/1
4 TABLET, ORALLY DISINTEGRATING ORAL EVERY 8 HOURS PRN
Qty: 20 TABLET | Refills: 0 | Status: SHIPPED | OUTPATIENT
Start: 2024-12-15 | End: 2024-12-22

## 2024-12-15 RX ORDER — METRONIDAZOLE 500 MG/1
500 TABLET ORAL 2 TIMES DAILY
Qty: 14 TABLET | Refills: 0 | Status: SHIPPED | OUTPATIENT
Start: 2024-12-15 | End: 2024-12-22

## 2024-12-15 RX ORDER — CIPROFLOXACIN 500 MG/1
500 TABLET ORAL 2 TIMES DAILY
Qty: 14 TABLET | Refills: 0 | Status: SHIPPED | OUTPATIENT
Start: 2024-12-15 | End: 2024-12-22

## 2024-12-15 RX ORDER — KETOROLAC TROMETHAMINE 15 MG/ML
15 INJECTION, SOLUTION INTRAMUSCULAR; INTRAVENOUS ONCE
Status: COMPLETED | OUTPATIENT
Start: 2024-12-15 | End: 2024-12-15

## 2024-12-15 RX ORDER — ONDANSETRON HYDROCHLORIDE 2 MG/ML
4 INJECTION, SOLUTION INTRAVENOUS ONCE
Status: COMPLETED | OUTPATIENT
Start: 2024-12-15 | End: 2024-12-15

## 2024-12-15 ASSESSMENT — PAIN - FUNCTIONAL ASSESSMENT: PAIN_FUNCTIONAL_ASSESSMENT: 0-10

## 2024-12-15 ASSESSMENT — PAIN DESCRIPTION - PAIN TYPE: TYPE: ACUTE PAIN

## 2024-12-15 ASSESSMENT — PAIN SCALES - GENERAL: PAINLEVEL_OUTOF10: 7

## 2024-12-15 ASSESSMENT — PAIN DESCRIPTION - LOCATION: LOCATION: ABDOMEN

## 2024-12-15 NOTE — ED TRIAGE NOTES
Pt arrives with c/o abdominal pain, nausea and loose stools ongoing since yesterday. Pt recently treated for ear infection with Augmentin from PCP. Pt reports hx of Ulcerative Colitis. Pt currently in no obvious distress.

## 2024-12-15 NOTE — ED PROVIDER NOTES
Emergency Department Provider Note        History of Present Illness     History provided by: Patient  Limitations to History: None  External Records Reviewed with Brief Summary: I reviewed prior ED visits, Care Everywhere, discharge summaries and outpatient records as appropriate.     HPI:  Danisha Marsh is a 84 y.o. female senting to the emergency department with abdominal pain.  States she was recently started on antibiotics for an ear infection has been taking Augmentin prescribed by her PCP.  Developed abdominal pain, nausea and loose stools ongoing since yesterday reports that she has a history of ulcerative colitis and frequently has loose stools of occasionally has bleeding, and reports that she has chronically low sodium levels.  Denies affiliated fever, chills, chest pain, shortness of breath, cough congestion or rhinorrhea.  Describes her abdominal pain is diffuse from her abdomen down into her suprapubic region.  Denies affiliated urinary symptoms including blood in the urine or blood in the stool.    Physical Exam   Triage vitals:  T 36 °C (96.8 °F)  HR 66  /81  RR 18  O2 99 % None (Room air)    General: Awake, alert, in no acute distress  Eyes: Gaze conjugate.  No scleral icterus or injection  HENT: Normo-cephalic, atraumatic. No stridor  CV: Regular rate, regular rhythm. Radial pulses 2+ bilaterally  Resp: Breathing non-labored, speaking in full sentences.  Clear to auscultation bilaterally  GI: Soft, non-distended, obese abdomen but diffusely tender to palpation.  No rebound or guarding.  MSK/Extremities: No gross bony deformities. Moving all extremities  Skin: Warm. Appropriate color  Neuro: Alert. Oriented. Face symmetric. Speech is fluent.  Gross strength and sensation intact in b/l UE and LEs  Psych: Appropriate mood and affect    Medical Decision Making & ED Course   Medical Decision Makin y.o. female presenting to the emergency department with concern for rectal bleeding.   On physical exam, patient is hemodynamically stable and well-appearing, in no acute distress, nontoxic in her appearance.  Given patient's symptoms and medical history, will obtain blood work and imaging, patient started on IV fluids and given antiemetics and pain control for symptomatic management.  CT of the abdomen and pelvis obtained which shows mild diverticulitis of the sigmoid colon. Labs are notable for a sodium of 125 which is near her baseline when compared to previous labs. She did receive 1L of NS. No additional electrolyte disturbances, no leukocytosis, no UTI.  No complicated abscess formation on CT, however given she has been symptomatic over the past few days, will plan to treat patient with Ciprofloxacin and Flagyl. On reevaluation, patient feels improved and is comfortable with plan for discharge and follow up with GI.  Patient discharged with close return precautions and close outpatient follow-up.  Patient discharged in stable condition.  ----     Social Determinants of Health which Significantly Impact Care: None identified     EKG Independent Interpretation: EKG not obtained    Independent Result Review and Interpretation: Relevant laboratory and radiographic results were reviewed and independently interpreted by myself.  As necessary, they are commented on in the ED Course.    Chronic conditions affecting the patient's care: None    The patient was discussed with the following consultants/services: None    Care Considerations: As documented above in MDM    ED Course:  Diagnoses as of 12/16/24 1638   Diverticulitis     Disposition   As a result of the work-up, the patient was discharged home.  she was informed of her diagnosis and instructed to come back with any concerns or worsening of condition.  she and was agreeable to the plan as discussed above.  she was given the opportunity to ask questions.  All of the patient's questions were answered.    Procedures   Procedures    Patient seen and  discussed with ED attending physician.    Aimee Camacho,   Emergency Medicine       Aimee Camacho DO  Resident  12/16/24 0991

## 2024-12-16 DIAGNOSIS — H72.91 ACUTE OTITIS MEDIA OF RIGHT EAR WITH PERFORATION: ICD-10-CM

## 2024-12-16 DIAGNOSIS — K57.92 DIVERTICULITIS: Primary | ICD-10-CM

## 2024-12-16 DIAGNOSIS — H66.91 ACUTE OTITIS MEDIA OF RIGHT EAR WITH PERFORATION: ICD-10-CM

## 2024-12-16 LAB — HOLD SPECIMEN: NORMAL

## 2024-12-16 RX ORDER — AMOXICILLIN AND CLAVULANATE POTASSIUM 875; 125 MG/1; MG/1
875 TABLET, FILM COATED ORAL 2 TIMES DAILY
Qty: 20 TABLET | Refills: 0 | Status: SHIPPED | OUTPATIENT
Start: 2024-12-16 | End: 2024-12-26

## 2024-12-16 NOTE — PROGRESS NOTES
Patient was recently hospitalized for diverticulitis flare.  She has been able to tolerate ciprofloxacin and is requesting a prescription for Augmentin to be sent to her pharmacy.

## 2024-12-19 ENCOUNTER — APPOINTMENT (OUTPATIENT)
Dept: PRIMARY CARE | Facility: CLINIC | Age: 84
End: 2024-12-19
Payer: MEDICARE

## 2024-12-19 ENCOUNTER — TELEPHONE (OUTPATIENT)
Dept: PRIMARY CARE | Facility: CLINIC | Age: 84
End: 2024-12-19

## 2024-12-19 DIAGNOSIS — F41.9 ANXIETY: ICD-10-CM

## 2024-12-19 RX ORDER — LORAZEPAM 0.5 MG/1
0.5 TABLET ORAL NIGHTLY
Qty: 30 TABLET | Refills: 0 | Status: SHIPPED | OUTPATIENT
Start: 2024-12-19 | End: 2025-01-18

## 2025-01-20 ENCOUNTER — TELEPHONE (OUTPATIENT)
Dept: PRIMARY CARE | Facility: CLINIC | Age: 85
End: 2025-01-20
Payer: MEDICARE

## 2025-01-20 DIAGNOSIS — F41.9 ANXIETY: ICD-10-CM

## 2025-01-20 RX ORDER — LORAZEPAM 0.5 MG/1
0.5 TABLET ORAL NIGHTLY
Qty: 30 TABLET | Refills: 0 | Status: SHIPPED | OUTPATIENT
Start: 2025-01-20 | End: 2025-02-19

## 2025-01-30 ENCOUNTER — APPOINTMENT (OUTPATIENT)
Dept: GASTROENTEROLOGY | Facility: CLINIC | Age: 85
End: 2025-01-30
Payer: COMMERCIAL

## 2025-02-04 ENCOUNTER — HOSPITAL ENCOUNTER (EMERGENCY)
Facility: HOSPITAL | Age: 85
Discharge: HOME | End: 2025-02-04
Payer: MEDICARE

## 2025-02-04 VITALS
WEIGHT: 208 LBS | TEMPERATURE: 98.1 F | DIASTOLIC BLOOD PRESSURE: 79 MMHG | RESPIRATION RATE: 16 BRPM | HEART RATE: 71 BPM | HEIGHT: 67 IN | OXYGEN SATURATION: 99 % | BODY MASS INDEX: 32.65 KG/M2 | SYSTOLIC BLOOD PRESSURE: 158 MMHG

## 2025-02-04 DIAGNOSIS — Z51.89 VISIT FOR WOUND CHECK: Primary | ICD-10-CM

## 2025-02-04 LAB
ALBUMIN SERPL BCP-MCNC: 3.6 G/DL (ref 3.4–5)
ALP SERPL-CCNC: 82 U/L (ref 33–136)
ALT SERPL W P-5'-P-CCNC: 20 U/L (ref 7–45)
ANION GAP SERPL CALC-SCNC: 14 MMOL/L (ref 10–20)
AST SERPL W P-5'-P-CCNC: 22 U/L (ref 9–39)
BASOPHILS # BLD AUTO: 0.04 X10*3/UL (ref 0–0.1)
BASOPHILS NFR BLD AUTO: 0.5 %
BILIRUB SERPL-MCNC: 0.7 MG/DL (ref 0–1.2)
BUN SERPL-MCNC: 14 MG/DL (ref 6–23)
CALCIUM SERPL-MCNC: 9.1 MG/DL (ref 8.6–10.3)
CHLORIDE SERPL-SCNC: 92 MMOL/L (ref 98–107)
CO2 SERPL-SCNC: 26 MMOL/L (ref 21–32)
CREAT SERPL-MCNC: 0.7 MG/DL (ref 0.5–1.05)
EGFRCR SERPLBLD CKD-EPI 2021: 86 ML/MIN/1.73M*2
EOSINOPHIL # BLD AUTO: 0.13 X10*3/UL (ref 0–0.4)
EOSINOPHIL NFR BLD AUTO: 1.5 %
ERYTHROCYTE [DISTWIDTH] IN BLOOD BY AUTOMATED COUNT: 13.8 % (ref 11.5–14.5)
GLUCOSE SERPL-MCNC: 98 MG/DL (ref 74–99)
HCT VFR BLD AUTO: 37.2 % (ref 36–46)
HGB BLD-MCNC: 12.9 G/DL (ref 12–16)
IMM GRANULOCYTES # BLD AUTO: 0.04 X10*3/UL (ref 0–0.5)
IMM GRANULOCYTES NFR BLD AUTO: 0.5 % (ref 0–0.9)
INR PPP: 1.5 (ref 0.9–1.1)
LYMPHOCYTES # BLD AUTO: 1 X10*3/UL (ref 0.8–3)
LYMPHOCYTES NFR BLD AUTO: 11.9 %
MCH RBC QN AUTO: 32.2 PG (ref 26–34)
MCHC RBC AUTO-ENTMCNC: 34.7 G/DL (ref 32–36)
MCV RBC AUTO: 93 FL (ref 80–100)
MONOCYTES # BLD AUTO: 1.02 X10*3/UL (ref 0.05–0.8)
MONOCYTES NFR BLD AUTO: 12.1 %
NEUTROPHILS # BLD AUTO: 6.19 X10*3/UL (ref 1.6–5.5)
NEUTROPHILS NFR BLD AUTO: 73.5 %
NRBC BLD-RTO: 0 /100 WBCS (ref 0–0)
PLATELET # BLD AUTO: 259 X10*3/UL (ref 150–450)
POTASSIUM SERPL-SCNC: 3.5 MMOL/L (ref 3.5–5.3)
PROT SERPL-MCNC: 7.1 G/DL (ref 6.4–8.2)
PROTHROMBIN TIME: 16.7 SECONDS (ref 9.8–12.8)
RBC # BLD AUTO: 4.01 X10*6/UL (ref 4–5.2)
SODIUM SERPL-SCNC: 128 MMOL/L (ref 136–145)
WBC # BLD AUTO: 8.4 X10*3/UL (ref 4.4–11.3)

## 2025-02-04 PROCEDURE — 85025 COMPLETE CBC W/AUTO DIFF WBC: CPT | Performed by: PHYSICIAN ASSISTANT

## 2025-02-04 PROCEDURE — 85610 PROTHROMBIN TIME: CPT | Performed by: PHYSICIAN ASSISTANT

## 2025-02-04 PROCEDURE — 36415 COLL VENOUS BLD VENIPUNCTURE: CPT | Performed by: PHYSICIAN ASSISTANT

## 2025-02-04 PROCEDURE — 99283 EMERGENCY DEPT VISIT LOW MDM: CPT

## 2025-02-04 PROCEDURE — 84075 ASSAY ALKALINE PHOSPHATASE: CPT | Performed by: PHYSICIAN ASSISTANT

## 2025-02-04 ASSESSMENT — PAIN SCALES - GENERAL: PAINLEVEL_OUTOF10: 6

## 2025-02-04 ASSESSMENT — PAIN DESCRIPTION - LOCATION: LOCATION: CHEST

## 2025-02-04 ASSESSMENT — PAIN DESCRIPTION - PAIN TYPE: TYPE: ACUTE PAIN

## 2025-02-04 ASSESSMENT — PAIN - FUNCTIONAL ASSESSMENT: PAIN_FUNCTIONAL_ASSESSMENT: 0-10

## 2025-02-04 NOTE — ED TRIAGE NOTES
Pt arrived to ED by [facility] for complaints of bleeding from her pacemaker site. Pt had a pacemaker placed at [facility] 1 week ago, on Sunday she noticed the site was bleeding through the dressing. She called the cardiologist who told her to place a pressure bandage on it. Which she stated help, but now she is having increased soreness and bleeding at the site.

## 2025-02-05 NOTE — ED PROVIDER NOTES
HPI   Chief Complaint   Patient presents with    Wound Check     Pt had a pacemaker placed at [facility] 1 week ago, on Sunday she noticed the site was bleeding through the dressing. She called the cardiologist who told her to place a pressure bandage on it. Which she stated help, but now she is having increased soreness and bleeding at the site.        History of present illness:  84-year-old female presents emergency room for complaints of wound check.  The patient states that she had a pacemaker placed 1 week ago.  She states this is her third coronary pacemaker that she has had and she states that beginning about 2 days ago she noticed at the top of the wound that there was a break in the skin and she states she initially went to the cardiology office and they placed some glue over the area.  She states began oozing blood again this morning she contacted office again today and was told to come to the nearest emergency room for further evaluation.  She states that she has no other symptoms time denies any chest pain drainage from the area other than blood and denies any swelling or signs of infection or purulent fluid.  She states that she is on Eliquis long-term.  She denies any other symptoms at this time.    Social history: Negative for alcohol and drug use.    Review of systems:   Gen.: No weight loss, fatigue, anorexia, insomnia, fever.   Eyes: No vision loss, double vision, drainage, eye pain.   ENT: No pharyngitis, dry mouth.   Cardiac: No chest pain, palpitations, syncope, near syncope.   Pulmonary: No shortness of breath, cough, hemoptysis.   Heme/lymph: No swollen glands, fever  GI: No abdominal pain, change in bowel habits, melena, hematemesis, hematochezia, nausea, vomiting, diarrhea.   : No discharge, dysuria, frequency, urgency, hematuria.   Musculoskeletal: No limb pain, joint pain, joint swelling.   Skin: No rashes.   Review of systems is otherwise negative unless stated above or in history of  present illness.    Physical exam:  General: Vitals noted, no distress. Afebrile.   EENT: No lymphadenopathy appreciated  Cardiac: Regular, rate, rhythm, no murmur.   Pulmonary: Lungs clear bilaterally with good aeration. No adventitious breath sounds.   Abdomen: Soft, nonsurgical. Nontender. No peritoneal signs. Normoactive bowel sounds.   Extremities: No peripheral edema.   Skin: No rash.  On exam of the pacemaker wound on the left upper chest there is a small break in the skin that is less than a centimeter, there is a small area of clotted blood at this time but there is no active bleeding at this time there is no obvious signs of infection cellulitis or abscess present.  Neuro: No focal neurologic deficits      Medical decision making:   Testing: INR is 1.5 CBC CMP unremarkable  Plan: Home-going.  Discussed differential. Will follow-up with the primary physician in the next 2-3 days. Return if worse. They understand return precautions and discharge instructions. Patient and family/friend/caregiver are in agreement with this plan. 84-year-old female presents emergency room for complaints of wound check.  The patient states that she had a pacemaker placed 1 week ago.  She states this is her third coronary pacemaker that she has had and she states that beginning about 2 days ago she noticed at the top of the wound that there was a break in the skin and she states she initially went to the cardiology office and they placed some glue over the area.  She states began oozing blood again this morning she contacted office again today and was told to come to the nearest emergency room for further evaluation.  She states that she has no other symptoms time denies any chest pain drainage from the area other than blood and denies any swelling or signs of infection or purulent fluid.  She states that she is on Eliquis long-term.  She denies any other symptoms at this time. Skin: No rash.  On exam of the pacemaker wound on  the left upper chest there is a small break in the skin that is less than a centimeter, there is a small area of clotted blood at this time but there is no active bleeding at this time there is no obvious signs of infection cellulitis or abscess present.  I explained to the patient that I did speak with her cardiology office who felt that she could follow-up in outpatient setting do not believe that she need any further care.  I instructed the nursing staff to please place dry gauze and to tape it in place over the wound and I also explained to the patient would be sending her home with supplies as well.  Impression:   1.  Wound check            History provided by:  Patient   used: No            Patient History   Past Medical History:   Diagnosis Date    Allergic     Arthritis     Blood in stool 2022    Cancer (Multi)     Cataract     Disease of thyroid gland     Diverticulitis 2022    Eczema     Heart disease stent 2018    Hypertension     Inflammatory bowel disease     Urinary tract infection     Varicella     Visual impairment      Past Surgical History:   Procedure Laterality Date    BACK SURGERY  1973    CARDIAC CATHETERIZATION      CARDIAC SURGERY  ablasion & pace maker for afib    COLON SURGERY      CORONARY STENT PLACEMENT  2018    HERNIA REPAIR  2007    JOINT REPLACEMENT  2019     Family History   Problem Relation Name Age of Onset    Cancer Mother Nereyda Yen     Heart disease Father Juanito Yen      Social History     Tobacco Use    Smoking status: Former     Current packs/day: 0.00     Average packs/day: 0.5 packs/day for 5.0 years (2.5 ttl pk-yrs)     Types: Cigarettes     Quit date: 1968     Years since quittin.1    Smokeless tobacco: Never   Vaping Use    Vaping status: Never Used   Substance Use Topics    Alcohol use: Not Currently    Drug use: Not Currently       Physical Exam   ED Triage Vitals [25 1136]   Temperature Heart Rate  Respirations BP   36.7 °C (98.1 °F) 65 16 168/81      Pulse Ox Temp src Heart Rate Source Patient Position   98 % -- -- --      BP Location FiO2 (%)     -- --       Physical Exam      ED Course & MDM   Diagnoses as of 02/05/25 0927   Visit for wound check                 No data recorded     Shonna Coma Scale Score: 15 (02/04/25 1137 : Celsa Webber RN)                           Medical Decision Making      Procedure  Procedures     Juanito Damico PA-C  02/05/25 [id number]

## 2025-03-14 ENCOUNTER — OFFICE VISIT (OUTPATIENT)
Dept: PRIMARY CARE | Facility: CLINIC | Age: 85
End: 2025-03-14
Payer: MEDICARE

## 2025-03-14 VITALS
HEIGHT: 67 IN | BODY MASS INDEX: 31.92 KG/M2 | DIASTOLIC BLOOD PRESSURE: 68 MMHG | WEIGHT: 203.4 LBS | OXYGEN SATURATION: 98 % | SYSTOLIC BLOOD PRESSURE: 148 MMHG | HEART RATE: 54 BPM

## 2025-03-14 DIAGNOSIS — Z00.00 ROUTINE ADULT HEALTH MAINTENANCE: ICD-10-CM

## 2025-03-14 DIAGNOSIS — N90.89 LABIAL IRRITATION: Primary | ICD-10-CM

## 2025-03-14 DIAGNOSIS — K64.9 BLEEDING HEMORRHOID: ICD-10-CM

## 2025-03-14 DIAGNOSIS — R09.82 POST-NASAL DRIP: ICD-10-CM

## 2025-03-14 DIAGNOSIS — N30.00 ACUTE CYSTITIS WITHOUT HEMATURIA: ICD-10-CM

## 2025-03-14 DIAGNOSIS — R30.0 BURNING WITH URINATION: ICD-10-CM

## 2025-03-14 PROBLEM — E87.6 HYPOKALEMIA: Status: RESOLVED | Noted: 2022-12-27 | Resolved: 2025-03-14

## 2025-03-14 PROBLEM — H66.91 ACUTE OTITIS MEDIA OF RIGHT EAR WITH PERFORATION: Status: RESOLVED | Noted: 2024-12-09 | Resolved: 2025-03-14

## 2025-03-14 PROBLEM — H72.91 ACUTE OTITIS MEDIA OF RIGHT EAR WITH PERFORATION: Status: RESOLVED | Noted: 2024-12-09 | Resolved: 2025-03-14

## 2025-03-14 LAB
POC APPEARANCE, URINE: CLEAR
POC BILIRUBIN, URINE: NEGATIVE
POC BLOOD, URINE: ABNORMAL
POC COLOR, URINE: YELLOW
POC GLUCOSE, URINE: NEGATIVE MG/DL
POC KETONES, URINE: NEGATIVE MG/DL
POC LEUKOCYTES, URINE: ABNORMAL
POC NITRITE,URINE: POSITIVE
POC PH, URINE: 6.5 PH
POC PROTEIN, URINE: ABNORMAL MG/DL
POC SPECIFIC GRAVITY, URINE: 1.02
POC UROBILINOGEN, URINE: 0.2 EU/DL

## 2025-03-14 RX ORDER — FLUCONAZOLE 150 MG/1
150 TABLET ORAL
Qty: 3 TABLET | Refills: 0 | Status: SHIPPED | OUTPATIENT
Start: 2025-03-14 | End: 2025-03-21

## 2025-03-14 RX ORDER — HYDROCORTISONE ACETATE PRAMOXINE HCL 2.5; 1 G/100G; G/100G
CREAM TOPICAL 2 TIMES DAILY
Qty: 30 G | Refills: 1 | Status: SHIPPED | OUTPATIENT
Start: 2025-03-14 | End: 2025-04-13

## 2025-03-14 ASSESSMENT — ENCOUNTER SYMPTOMS
MYALGIAS: 0
ANOREXIA: 0
SORE THROAT: 0
CONSTIPATION: 1
DYSURIA: 1
CONSTIPATION: 0
WEAKNESS: 0
NAUSEA: 0
HEMATURIA: 0
DYSURIA: 0
OCCASIONAL FEELINGS OF UNSTEADINESS: 0
FREQUENCY: 1
FATIGUE: 0
HEADACHES: 0
WOUND: 0
FEVER: 0
VOMITING: 0
WHEEZING: 0
BACK PAIN: 0
DIARRHEA: 0
ABDOMINAL PAIN: 0
BACK PAIN: 1
SEIZURES: 0
BRUISES/BLEEDS EASILY: 1
LOSS OF SENSATION IN FEET: 0
PALPITATIONS: 0
COUGH: 1
DIZZINESS: 0
FLANK PAIN: 0
DEPRESSION: 0
CHILLS: 0
COLOR CHANGE: 0
EYE PAIN: 0
JOINT SWELLING: 0
ARTHRALGIAS: 1
DIFFICULTY URINATING: 0
ABDOMINAL DISTENTION: 0
TROUBLE SWALLOWING: 0
ADENOPATHY: 0
SHORTNESS OF BREATH: 0

## 2025-03-14 ASSESSMENT — PATIENT HEALTH QUESTIONNAIRE - PHQ9
SUM OF ALL RESPONSES TO PHQ9 QUESTIONS 1 AND 2: 0
1. LITTLE INTEREST OR PLEASURE IN DOING THINGS: NOT AT ALL
2. FEELING DOWN, DEPRESSED OR HOPELESS: NOT AT ALL

## 2025-03-14 NOTE — ASSESSMENT & PLAN NOTE
Patient to trial alternative antihistamine for increased symptom control.  Provider to be notified of patient response

## 2025-03-14 NOTE — ASSESSMENT & PLAN NOTE
Will initiate course of oral Diflucan due to suspected fungal/yeast irritation.  Patient also to follow-up with gynecology for additional evaluation and treatment recommendations regarding labial irritation and associated skin lesion.  Provider to be notified for any persistent/worsening skin/irritation concerns

## 2025-03-14 NOTE — PATIENT INSTRUCTIONS
Please schedule follow up with gynecology and notify provider for any persistent/ worsening vaginal concerns

## 2025-03-14 NOTE — PROGRESS NOTES
Subjective   Patient ID: Danisha Marsh is a 84 y.o. female who presents for Vaginal Pain (For about a week ) and Hemorrhoids.    Patient seen today due to LORY area irritation.  Patient states that for approximately the past week, she has had increased labial/external vaginal irritation.  She states that site is tender and feels inflamed.  No associated drainage or foul odor.  Patient states she is trialed Monistat and even hydrocortisone cream without any improvement of symptoms.  Patient routinely uses hydrocortisone lotion for hemorrhoid irritation but reports being careful not to cross contaminate the vaginal area.  Patient also reports the presence of a skin lesion that has been relatively stable on the external left labia for several years.  No recent gynecology follow-up reported.  Patient reports burning with urination secondary to the urine hitting her skin.  No reported fever, chills or other systemic concerns.    No reported issues with appetite, staying hydrated, bowel.  Discussed pacemaker battery placement which occurred in January, 2025.  She states that delayed healing occurred but surgical site is now completely closed.  No reported concerns for infection at this time.  Patient denies any drainage, fever, chills.  Patient continues to have issues with coughing at night due to postnasal drip.  She states that she will trial an alternative antihistamine to see if this helps with symptoms control.  No reported issues with shortness of breath, chest pain, wheezing or other respiratory issues.   Medications reviewed.  No other acute concerns voiced at this time.    Female  Problem  The patient's pertinent negatives include no genital itching or genital rash. This is a new problem. The current episode started in the past 7 days. The problem occurs constantly. The problem has been gradually worsening. The pain is moderate. The problem affects the left side. She is not pregnant. Associated symptoms  include back pain and dysuria. Pertinent negatives include no abdominal pain, anorexia, chills, constipation, diarrhea, discolored urine, fever, headaches, joint pain, joint swelling, nausea, painful intercourse, rash, sore throat or vomiting. The symptoms are aggravated by bowel movements. She is not sexually active. No, her partner does not have an STD. She uses nothing for contraception. She is postmenopausal.   Vaginal Pain  The patient's pertinent negatives include no genital itching or genital rash. Associated symptoms include back pain and dysuria. Pertinent negatives include no abdominal pain, anorexia, chills, constipation, diarrhea, discolored urine, fever, headaches, joint pain, joint swelling, nausea, painful intercourse, rash, sore throat or vomiting.         Current Outpatient Medications on File Prior to Visit   Medication Sig Dispense Refill    acetaminophen (Tylenol) 325 mg tablet Take 2 tablets (650 mg) by mouth every 6 hours if needed for moderate pain (4 - 6).      amiodarone (Pacerone) 200 mg tablet Take 1 tablet (200 mg) by mouth early in the morning..      amLODIPine (Norvasc) 5 mg tablet Take 1 tablet (5 mg) by mouth every 12 hours.      azelastine-fluticasone (Dymista) 137-50 mcg/spray nasal spray Administer 1 spray into each nostril 2 times a day. Use in each nostril as directed 1 each 0    clindamycin (Cleocin) 300 mg capsule Take two capsules one hour before your dentist appointment      Eliquis 5 mg tablet Take 1 tablet (5 mg) by mouth every 12 hours.      guaiFENesin (Robitussin) 100 mg/5 mL syrup Take 10 mL (200 mg) by mouth 3 times a day as needed for cough.      indapamide (Lozol) 2.5 mg tablet Take 1 tablet (2.5 mg) by mouth once daily. 90 tablet 3    irbesartan (Avapro) 300 mg tablet Take 0.5 tablets (150 mg) by mouth 2 times a day. 90 tablet 3    methIMAzole (Tapazole) 5 mg tablet 1 tab daily then 1 tab in the evening every other day 154 tablet 1    metoprolol tartrate (Lopressor)  50 mg tablet Take 1 tablet by mouth every 12 hours. 180 tablet 1    nystatin (Mycostatin) 100,000 unit/gram powder Apply 1 Application topically 2 times a day. 60 g 2    nystatin (Mycostatin) cream Apply topically if needed (Applied to affected area twice daily as needed). 60 g 2    penicillin v potassium (Veetid) 500 mg tablet Take 4 tablets (2,000 mg) by mouth. TAKE 4 TABLETS 1 HOUR PRIOR TO PROCEDURE, 2 TABLETS POST PROCEDURE      rosuvastatin (Crestor) 20 mg tablet Take 1 tablet (20 mg) by mouth once daily at bedtime.      [DISCONTINUED] LORazepam (Ativan) 0.5 mg tablet Take 1 tablet (0.5 mg) by mouth once daily at bedtime. 30 tablet 0     No current facility-administered medications on file prior to visit.       Past Medical History:   Diagnosis Date    Allergic     Arthritis     Blood in stool 12/27/2022    Cancer (Multi) 2007    Cataract 2024    Disease of thyroid gland     Diverticulitis 12/27/2022    Eczema     Heart disease stent 2018    Hypertension     Inflammatory bowel disease     Urinary tract infection     Varicella     Visual impairment         Past Surgical History:   Procedure Laterality Date    BACK SURGERY  1973    CARDIAC CATHETERIZATION      CARDIAC SURGERY  ablasion & pace maker for afib    COLON SURGERY  2007    CORONARY STENT PLACEMENT  2018    HERNIA REPAIR  2007    JOINT REPLACEMENT  2019        Family History   Problem Relation Name Age of Onset    Cancer Mother Nereyda Yen     Colon cancer Mother Nereyda Yen     Heart disease Father Juanito Yen         Review of Systems   Constitutional:  Negative for chills, fatigue and fever.   HENT:  Positive for postnasal drip. Negative for congestion, dental problem, sore throat and trouble swallowing.         Reports chronic postnasal drip   Eyes:  Negative for pain and visual disturbance.        Wears glasses.  Underwent bilateral cataract surgery   Respiratory:  Positive for cough. Negative for shortness of breath and wheezing.         See HPI  "  Cardiovascular:  Negative for chest pain, palpitations and leg swelling.   Gastrointestinal:  Negative for abdominal distention, abdominal pain, anorexia, constipation, diarrhea, nausea and vomiting.        Positive for colon cancer history.     Endocrine: Negative for cold intolerance and heat intolerance.   Genitourinary:  Positive for dysuria and vaginal pain. Negative for difficulty urinating.        See HPI   Musculoskeletal:  Positive for arthralgias, back pain and gait problem. Negative for joint pain, joint swelling and myalgias.        Positive for chronic bilateral hip and lower back pain.    Skin:  Negative for color change, pallor, rash and wound.        Positive skin cancer history.   Allergic/Immunologic: Negative for environmental allergies and food allergies.   Neurological:  Negative for dizziness, seizures, weakness and headaches.   Hematological:  Negative for adenopathy. Bruises/bleeds easily.   Psychiatric/Behavioral:  Negative for behavioral problems.    All other systems reviewed and are negative.      Objective   /68   Pulse 54   Ht 1.702 m (5' 7\")   Wt 92.3 kg (203 lb 6.4 oz)   SpO2 98%   BMI 31.86 kg/m²     Physical Exam  Constitutional:       General: She is not in acute distress.     Appearance: Normal appearance. She is not toxic-appearing.   HENT:      Head: Normocephalic and atraumatic.      Right Ear: External ear normal. Tympanic membrane is perforated.      Left Ear: External ear normal.      Nose: Nose normal.      Mouth/Throat:      Mouth: Mucous membranes are moist.      Pharynx: Oropharynx is clear.      Comments: Missing just a few teeth.  Overall fair dentition  Eyes:      Extraocular Movements: Extraocular movements intact.      Conjunctiva/sclera: Conjunctivae normal.   Cardiovascular:      Heart sounds: No murmur heard.  Pulmonary:      Effort: Pulmonary effort is normal.   Genitourinary:     Labia:         Left: Tenderness present.           Comments: " Increased tenderness, swelling and some redness.  No open lesions, drainage or signs of trauma.  One small black skin lesion noted with no underlying induration  Musculoskeletal:         General: No swelling.      Cervical back: Normal range of motion and neck supple.      Comments: Patient ambulates with a cane   Skin:     General: Skin is warm and dry.      Comments: Healing PPM site to left upper chest.  Venous staining to BLE. Varicose veins to BLE. Small skin lesion to left lower calf and right forearm; scaly and raised (Not observed during today's visit)   Neurological:      General: No focal deficit present.      Mental Status: She is alert and oriented to person, place, and time. Mental status is at baseline.      Cranial Nerves: No cranial nerve deficit.      Motor: No weakness.   Psychiatric:         Mood and Affect: Mood normal.         Behavior: Behavior normal.         Thought Content: Thought content normal.         Judgment: Judgment normal.         Assessment/Plan   Problem List Items Addressed This Visit             ICD-10-CM    Post-nasal drip R09.82     Patient to trial alternative antihistamine for increased symptom control.  Provider to be notified of patient response         Routine adult health maintenance Z00.00     Most recent blood work reviewed.  Will continue to monitor as appropriate  -Patient follows routinely with gastroenterology for colonoscopies given history of colon cancer.  -Due to patient's age, no routine mammogram or pelvic exam indicated at this time         Bleeding hemorrhoid K64.9    Relevant Medications    hydrocortisone-pramoxine (Analpram-HC) 2.5-1 % cream    Labial irritation - Primary N90.89     Will initiate course of oral Diflucan due to suspected fungal/yeast irritation.  Patient also to follow-up with gynecology for additional evaluation and treatment recommendations regarding labial irritation and associated skin lesion.  Provider to be notified for any  persistent/worsening skin/irritation concerns         Relevant Medications    fluconazole (Diflucan) 150 mg tablet    Other Relevant Orders    Referral to Gynecology    POCT UA (nonautomated) manually resulted (Completed)    Urine Culture    Burning with urination R30.0     Sample collected today for UA/UC for additional assessment purposes given reported dysuria         Relevant Orders    Urine Culture

## 2025-03-14 NOTE — ASSESSMENT & PLAN NOTE
Battery replacement completed January, 2024 with Dr. Blanco, EP cardiologist.  Delayed healing reported but otherwise no other surgical complications reported.  Patient to maintain routine follow-up with ID specialist for continued evaluation and treatment recommendations

## 2025-03-16 LAB — BACTERIA UR CULT: ABNORMAL

## 2025-03-16 RX ORDER — NITROFURANTOIN 25; 75 MG/1; MG/1
100 CAPSULE ORAL 2 TIMES DAILY
Qty: 10 CAPSULE | Refills: 0 | Status: SHIPPED | OUTPATIENT
Start: 2025-03-16 | End: 2025-03-21

## 2025-03-17 DIAGNOSIS — N30.00 ACUTE CYSTITIS WITHOUT HEMATURIA: Primary | ICD-10-CM

## 2025-03-17 RX ORDER — CEFDINIR 300 MG/1
300 CAPSULE ORAL 2 TIMES DAILY
Qty: 10 CAPSULE | Refills: 0 | Status: SHIPPED | OUTPATIENT
Start: 2025-03-17 | End: 2025-03-22

## 2025-03-17 NOTE — PROGRESS NOTES
Patient reports that she does not tolerate nitrofurantoin.  Will initiate Omnicef due to cross reaction with fluoroquinolones and cardiac medications

## 2025-03-20 ASSESSMENT — ENCOUNTER SYMPTOMS: DIARRHEA: 1

## 2025-03-21 ENCOUNTER — APPOINTMENT (OUTPATIENT)
Facility: CLINIC | Age: 85
End: 2025-03-21
Payer: MEDICARE

## 2025-03-21 VITALS
WEIGHT: 202 LBS | DIASTOLIC BLOOD PRESSURE: 78 MMHG | HEIGHT: 65 IN | BODY MASS INDEX: 33.66 KG/M2 | SYSTOLIC BLOOD PRESSURE: 132 MMHG

## 2025-03-21 DIAGNOSIS — N90.89 LABIAL IRRITATION: ICD-10-CM

## 2025-03-21 DIAGNOSIS — R39.9 UTI SYMPTOMS: ICD-10-CM

## 2025-03-21 DIAGNOSIS — L73.9 FOLLICULITIS: Primary | ICD-10-CM

## 2025-03-21 PROCEDURE — 99213 OFFICE O/P EST LOW 20 MIN: CPT | Performed by: ADVANCED PRACTICE MIDWIFE

## 2025-03-21 PROCEDURE — 1036F TOBACCO NON-USER: CPT | Performed by: ADVANCED PRACTICE MIDWIFE

## 2025-03-21 PROCEDURE — 3078F DIAST BP <80 MM HG: CPT | Performed by: ADVANCED PRACTICE MIDWIFE

## 2025-03-21 PROCEDURE — 3075F SYST BP GE 130 - 139MM HG: CPT | Performed by: ADVANCED PRACTICE MIDWIFE

## 2025-03-21 PROCEDURE — 1159F MED LIST DOCD IN RCRD: CPT | Performed by: ADVANCED PRACTICE MIDWIFE

## 2025-03-21 ASSESSMENT — ENCOUNTER SYMPTOMS
DIARRHEA: 1
COUGH: 0
NAUSEA: 0
SHORTNESS OF BREATH: 0
CONSTIPATION: 0
FATIGUE: 0
FEVER: 0
PALPITATIONS: 0
LIGHT-HEADEDNESS: 0
DIZZINESS: 0
VOMITING: 0
ABDOMINAL PAIN: 0

## 2025-03-21 NOTE — PROGRESS NOTES
Subjective   Patient ID: Danisha Marsh is a 84 y.o. female who presents for Vaginitis/Bacterial Vaginosis (Pt c/o vaginal irritation. C/o itching/ possible bumps./Saw PCP, was told she had a UTI, only took 2 doses and d/c'd due to diarrhea. Would like urine rechecked.).  Female  Problem  The patient's primary symptoms include genital itching and genital lesions. The patient's pertinent negatives include no pelvic pain or vaginal discharge. The current episode started in the past 7 days. The problem occurs daily. The problem has been unchanged. The pain is mild. The problem affects the left side. She is not pregnant. Associated symptoms include diarrhea and rash. Pertinent negatives include no abdominal pain, constipation, fever, nausea or vomiting. The symptoms are aggravated by bowel movements. She is not sexually active. No, her partner does not have an STD. She uses nothing for contraception. She is postmenopausal.       85 y/o presents to office with c/o vaginal irritation. Some itching towards anus. Admits to some small bumps, one has drained. Not sexually active.       Review of Systems   Constitutional:  Negative for fatigue and fever.   Respiratory:  Negative for cough and shortness of breath.    Cardiovascular:  Negative for chest pain and palpitations.   Gastrointestinal:  Positive for diarrhea. Negative for abdominal pain, constipation, nausea and vomiting.   Endocrine: Negative for cold intolerance and heat intolerance.   Genitourinary:  Negative for dyspareunia, pelvic pain, vaginal discharge and vaginal pain.   Skin:  Positive for rash.   Neurological:  Negative for dizziness and light-headedness.       Objective   Physical Exam  Genitourinary:     Labia:         Right: Lesion present.              Assessment/Plan   Diagnoses and all orders for this visit:  Folliculitis  Labial irritation  -     Referral to Gynecology  UTI symptoms  -     Urine culture       Discussed use of warm compresses.      KARLA De Paz-CNM 03/21/25 6:37 PM

## 2025-03-23 LAB — BACTERIA UR CULT: NORMAL

## 2025-03-25 DIAGNOSIS — N30.00 ACUTE CYSTITIS WITHOUT HEMATURIA: Primary | ICD-10-CM

## 2025-03-25 RX ORDER — SULFAMETHOXAZOLE AND TRIMETHOPRIM 800; 160 MG/1; MG/1
1 TABLET ORAL 2 TIMES DAILY
Qty: 10 TABLET | Refills: 0 | Status: SHIPPED | OUTPATIENT
Start: 2025-03-25 | End: 2025-03-30

## 2025-03-25 NOTE — PROGRESS NOTES
Patient reports tolerating Bactrim for suspected urinary tract infection.  New prescription sent to pharmacy

## 2025-04-22 ENCOUNTER — APPOINTMENT (OUTPATIENT)
Dept: DERMATOLOGY | Facility: CLINIC | Age: 85
End: 2025-04-22
Payer: COMMERCIAL

## 2025-05-08 ENCOUNTER — APPOINTMENT (OUTPATIENT)
Dept: GASTROENTEROLOGY | Facility: CLINIC | Age: 85
End: 2025-05-08
Payer: MEDICARE

## 2025-05-08 VITALS
SYSTOLIC BLOOD PRESSURE: 157 MMHG | OXYGEN SATURATION: 97 % | WEIGHT: 211 LBS | BODY MASS INDEX: 35.16 KG/M2 | HEIGHT: 65 IN | HEART RATE: 59 BPM | DIASTOLIC BLOOD PRESSURE: 80 MMHG

## 2025-05-08 DIAGNOSIS — Z85.038 HISTORY OF COLON CANCER: ICD-10-CM

## 2025-05-08 DIAGNOSIS — D12.6 TUBULAR ADENOMA OF COLON: ICD-10-CM

## 2025-05-08 DIAGNOSIS — K57.92 DIVERTICULITIS: Primary | ICD-10-CM

## 2025-05-08 PROCEDURE — 99203 OFFICE O/P NEW LOW 30 MIN: CPT | Performed by: INTERNAL MEDICINE

## 2025-05-08 PROCEDURE — 1159F MED LIST DOCD IN RCRD: CPT | Performed by: INTERNAL MEDICINE

## 2025-05-08 PROCEDURE — 3077F SYST BP >= 140 MM HG: CPT | Performed by: INTERNAL MEDICINE

## 2025-05-08 PROCEDURE — 3079F DIAST BP 80-89 MM HG: CPT | Performed by: INTERNAL MEDICINE

## 2025-05-08 NOTE — PATIENT INSTRUCTIONS
Continue to watch  Metamucil once a day  Consider flexible sigmoidoscopy with biopsy if persistent symptoms  Follow-up with me in 3 months.

## 2025-05-08 NOTE — PROGRESS NOTES
Subjective    Danisha Marsh is a 84 y.o. female presenting to [facility] care with GI and for ED imaging concerning for diverticulitis. Patient has a past medical history of colon cancer s/p hemicolectomy with end to side ileocolic anastamosis in 2007, anal fissures, hyperthyroidism, CAD s/p PCI in 2019, atrial fibrillation on apixaban, sick sinus syndrome s/p PPM, and HTN. She presented to ED in 12/2024 for abdominal pain and diarrhea. At the time she was diagnosed with acute uncomplicated diverticulitis and discharged on amoxicillin/clavalunate. Since then, she has had bouts of diarrhea lasting for approximately 3 days which resolve spontaneously. She cannot identify a specific trigger. This occurred most recently last week and resolved spontaneously.    Objective    Physical Exam  Constitutional:       General: She is not in acute distress.     Appearance: Normal appearance. She is obese. She is not ill-appearing.   HENT:      Head: Normocephalic and atraumatic.      Nose: Nose normal.      Mouth/Throat:      Mouth: Mucous membranes are moist.      Pharynx: Oropharynx is clear.   Eyes:      Extraocular Movements: Extraocular movements intact.      Pupils: Pupils are equal, round, and reactive to light.   Cardiovascular:      Rate and Rhythm: Normal rate and regular rhythm.      Pulses: Normal pulses.   Pulmonary:      Effort: Pulmonary effort is normal.   Abdominal:      General: Abdomen is flat. There is no distension.      Palpations: Abdomen is soft.      Tenderness: There is no abdominal tenderness. There is no guarding.      Hernia: No hernia is present.   Musculoskeletal:         General: Normal range of motion.      Right lower leg: No edema.      Left lower leg: No edema.   Skin:     General: Skin is warm and dry.      Capillary Refill: Capillary refill takes less than 2 seconds.   Neurological:      General: No focal deficit present.      Mental Status: She is alert and oriented to person, place, and  time.     Heart Rate:  [59] 59  BP: (157)/(80) 157/80  Vitals:    05/08/25 1025   Weight: 95.7 kg (211 lb)     Labs:  CBC:  Recent Labs     02/04/25  1225 12/15/24  1211 12/09/24  1019   WBC 8.4 12.8* 13.6*   HGB 12.9 14.2 14.2   HCT 37.2 40.8 43.6    232 297   MCV 93 91 94     CMP:  Recent Labs     02/04/25  1225 12/15/24  1211 07/29/24  1354   * 125* 129*   K 3.5 4.1 4.2   CL 92* 88* 93*   CO2 26 26 24   ANIONGAP 14 15 16   BUN 14 15 12   CREATININE 0.70 0.74 0.71   EGFR 86 81 85   GLUCOSE 98 95 95     Recent Labs     02/04/25  1225 12/15/24  1211 07/29/24  1354   ALBUMIN 3.6 4.0 3.9   ALKPHOS 82 93 98   ALT 20 41 33   AST 22 27 36   BILITOT 0.7 1.1 0.7   LIPASE  --  19 18     Calcium/Phos:   Lab Results   Component Value Date    CALCIUM 9.1 02/04/2025      COAG:   Recent Labs     02/04/25  1225 07/10/24  1325   INR 1.5* 1.2*     ENDO:  Recent Labs     06/14/24  1034 06/22/21  1050   HGBA1C 5.7* 5.9*      Imaging:  CT abdomen/pelvis with IV contrast  Result date 12/15/2024  IMPRESSION:  1. Findings suggestive of mild acute uncomplicated diverticulitis  involving the proximal to mid sigmoid colon. No extraluminal gas or  abscess.  2. Hepatic steatosis.  3. Cholelithiasis.  4. Coronary artery calcifications.    Screening colonoscopy 2/21/2023  - Patent end-to-side ileo-colonic anastomosis, characterized by healthy appearing mucosa.   - The examined portion of the ileum was normal.   - Two 4 to 5 mm polyps in the rectum and in the transverse colon, removed with a cold snare.      Resected and retrieved.   - Segmental mild inflammation was found in the sigmoid colon from 20 to 25 cm with submucosal edema. Consider segmental colitis associated with diverticulosis. Biopsied.   - Diverticulosis in the sigmoid colon and in the descending colon.   - Non-bleeding internal hemorrhoids.     Meds:  Current Outpatient Medications   Medication Instructions    acetaminophen (TYLENOL) 650 mg, Every 6 hours PRN     amiodarone (Pacerone) 200 mg tablet 1 tablet, Daily (0630)    amLODIPine (NORVASC) 5 mg, Every 12 hours scheduled (0630,1830)    azelastine-fluticasone (Dymista) 137-50 mcg/spray nasal spray 1 spray, Each Nostril, 2 times daily, Use in each nostril as directed    clindamycin (Cleocin) 300 mg capsule Take two capsules one hour before your dentist appointment    Eliquis 5 mg, Every 12 hours scheduled (0630,1830)    guaiFENesin (ROBITUSSIN) 200 mg, 3 times daily PRN    indapamide (LOZOL) 2.5 mg, oral, Daily    irbesartan (AVAPRO) 150 mg, oral, 2 times daily    methIMAzole (Tapazole) 5 mg tablet 1 tab daily then 1 tab in the evening every other day    metoprolol tartrate (LOPRESSOR) 50 mg, oral, Every 12 hours scheduled (0630,1830)    nystatin (Mycostatin) 100,000 unit/gram powder 1 Application, Topical, 2 times daily    nystatin (Mycostatin) cream Topical, As needed    penicillin v potassium (VEETID) 2,000 mg    rosuvastatin (CRESTOR) 20 mg, Nightly     Assessment    Danisha Marsh is a 84 y.o. female with a pmhx of colon cancer s/p right  hemicolectomy in 2007, anal fissure, diverticulosis with diverticulitis, atrial fibrillation on apixaban, sick sinus syndrome s/p PPM presenting to establish care and for ED follow up. Diverticulitis appears to have largely resolved. Recurrent symptoms and colonoscopic evidence of inflammation raise possibility of underling IBD vs IBS. Because colonoscopic evaluation was performed within the last 2 years, the patient does not require an additional procedure for the evaluation of her diverticulitis at this time.    Plan  - Continue current medical management of chronic medical problems  - Monitor symptoms closely  - If symptoms worsening/recur, consider colonoscopic evaluation  - Return to GI clinic in 3 months    Milind Dominguez MD MS  PGY2 Internal Medicine   I saw and evaluated the patient. I personally obtained the key and critical portions of the history and physical exam or  was physically present for key and critical portions performed by the resident. I reviewed the resident's documentation and discussed the patient with the resident. I agree with the resident's medical decision making as documented in the note.     84-year-old female with comorbidities including atrial fibrillation on Eliquis, amiodarone, colon cancer, tubular adenoma, diverticulosis came to GI clinic for further evaluation due to diverticulitis  She was diagnosed diverticulitis in December 2024 treated with antibiotic.  Also report intermittent diarrhea over the yr.  Denies abdominal pain rectal bleeding.  She had colonoscopy at [facility] in 2023: Tubular adenoma x 2, sigmoiditis, biopsy-acute colitis .  SP right hemicolectomy  Cholelithiasis  Hepatic steatosis.      Continue to watch  Metamucil once a day  Consider flexible sigmoidoscopy with biopsy if persistent symptoms  Workup due to hepatic steatosis in  next visit  Follow-up with me in 3 months.

## 2025-05-16 DIAGNOSIS — K57.92 DIVERTICULITIS: Primary | ICD-10-CM

## 2025-05-16 RX ORDER — AMOXICILLIN AND CLAVULANATE POTASSIUM 500; 125 MG/1; MG/1
1 TABLET, FILM COATED ORAL 2 TIMES DAILY
Qty: 14 TABLET | Refills: 0 | Status: SHIPPED | OUTPATIENT
Start: 2025-05-16 | End: 2025-05-23

## 2025-05-19 ENCOUNTER — APPOINTMENT (OUTPATIENT)
Dept: RADIOLOGY | Facility: HOSPITAL | Age: 85
End: 2025-05-19
Payer: MEDICARE

## 2025-05-19 ENCOUNTER — APPOINTMENT (OUTPATIENT)
Dept: CARDIOLOGY | Facility: HOSPITAL | Age: 85
End: 2025-05-19
Payer: MEDICARE

## 2025-05-19 ENCOUNTER — HOSPITAL ENCOUNTER (EMERGENCY)
Facility: HOSPITAL | Age: 85
Discharge: HOME | End: 2025-05-19
Attending: EMERGENCY MEDICINE
Payer: MEDICARE

## 2025-05-19 VITALS
WEIGHT: 210 LBS | HEIGHT: 65 IN | OXYGEN SATURATION: 100 % | RESPIRATION RATE: 20 BRPM | HEART RATE: 80 BPM | BODY MASS INDEX: 34.99 KG/M2 | TEMPERATURE: 96.8 F | SYSTOLIC BLOOD PRESSURE: 162 MMHG | DIASTOLIC BLOOD PRESSURE: 66 MMHG

## 2025-05-19 DIAGNOSIS — R07.9 CHEST PAIN, UNSPECIFIED TYPE: Primary | ICD-10-CM

## 2025-05-19 LAB
ALBUMIN SERPL BCP-MCNC: 4 G/DL (ref 3.4–5)
ALP SERPL-CCNC: 98 U/L (ref 33–136)
ALT SERPL W P-5'-P-CCNC: 23 U/L (ref 7–45)
ANION GAP SERPL CALC-SCNC: 15 MMOL/L (ref 10–20)
APPEARANCE UR: CLEAR
AST SERPL W P-5'-P-CCNC: 18 U/L (ref 9–39)
BASOPHILS # BLD AUTO: 0.03 X10*3/UL (ref 0–0.1)
BASOPHILS NFR BLD AUTO: 0.3 %
BILIRUB SERPL-MCNC: 0.8 MG/DL (ref 0–1.2)
BILIRUB UR STRIP.AUTO-MCNC: NEGATIVE MG/DL
BUN SERPL-MCNC: 15 MG/DL (ref 6–23)
CALCIUM SERPL-MCNC: 9 MG/DL (ref 8.6–10.3)
CARDIAC TROPONIN I PNL SERPL HS: 10 NG/L (ref 0–13)
CARDIAC TROPONIN I PNL SERPL HS: 8 NG/L (ref 0–13)
CHLORIDE SERPL-SCNC: 92 MMOL/L (ref 98–107)
CO2 SERPL-SCNC: 26 MMOL/L (ref 21–32)
COLOR UR: ABNORMAL
CREAT SERPL-MCNC: 0.78 MG/DL (ref 0.5–1.05)
EGFRCR SERPLBLD CKD-EPI 2021: 76 ML/MIN/1.73M*2
EOSINOPHIL # BLD AUTO: 0.12 X10*3/UL (ref 0–0.4)
EOSINOPHIL NFR BLD AUTO: 1.1 %
ERYTHROCYTE [DISTWIDTH] IN BLOOD BY AUTOMATED COUNT: 14.6 % (ref 11.5–14.5)
GLUCOSE SERPL-MCNC: 101 MG/DL (ref 74–99)
GLUCOSE UR STRIP.AUTO-MCNC: NORMAL MG/DL
HCT VFR BLD AUTO: 39.3 % (ref 36–46)
HGB BLD-MCNC: 13.9 G/DL (ref 12–16)
HOLD SPECIMEN: 293
IMM GRANULOCYTES # BLD AUTO: 0.05 X10*3/UL (ref 0–0.5)
IMM GRANULOCYTES NFR BLD AUTO: 0.5 % (ref 0–0.9)
KETONES UR STRIP.AUTO-MCNC: NEGATIVE MG/DL
LEUKOCYTE ESTERASE UR QL STRIP.AUTO: ABNORMAL
LYMPHOCYTES # BLD AUTO: 1.63 X10*3/UL (ref 0.8–3)
LYMPHOCYTES NFR BLD AUTO: 15.5 %
MAGNESIUM SERPL-MCNC: 1.7 MG/DL (ref 1.6–2.4)
MCH RBC QN AUTO: 32.4 PG (ref 26–34)
MCHC RBC AUTO-ENTMCNC: 35.4 G/DL (ref 32–36)
MCV RBC AUTO: 92 FL (ref 80–100)
MONOCYTES # BLD AUTO: 1.07 X10*3/UL (ref 0.05–0.8)
MONOCYTES NFR BLD AUTO: 10.2 %
MUCOUS THREADS #/AREA URNS AUTO: NORMAL /LPF
NEUTROPHILS # BLD AUTO: 7.62 X10*3/UL (ref 1.6–5.5)
NEUTROPHILS NFR BLD AUTO: 72.4 %
NITRITE UR QL STRIP.AUTO: NEGATIVE
NRBC BLD-RTO: 0 /100 WBCS (ref 0–0)
PH UR STRIP.AUTO: 6.5 [PH]
PLATELET # BLD AUTO: 228 X10*3/UL (ref 150–450)
POTASSIUM SERPL-SCNC: 3.3 MMOL/L (ref 3.5–5.3)
PROT SERPL-MCNC: 6.9 G/DL (ref 6.4–8.2)
PROT UR STRIP.AUTO-MCNC: NEGATIVE MG/DL
RBC # BLD AUTO: 4.29 X10*6/UL (ref 4–5.2)
RBC # UR STRIP.AUTO: NEGATIVE MG/DL
RBC #/AREA URNS AUTO: NORMAL /HPF
SODIUM SERPL-SCNC: 130 MMOL/L (ref 136–145)
SP GR UR STRIP.AUTO: 1.01
SQUAMOUS #/AREA URNS AUTO: NORMAL /HPF
TSH SERPL-ACNC: 2.19 MIU/L (ref 0.44–3.98)
UROBILINOGEN UR STRIP.AUTO-MCNC: NORMAL MG/DL
WBC # BLD AUTO: 10.5 X10*3/UL (ref 4.4–11.3)
WBC #/AREA URNS AUTO: NORMAL /HPF

## 2025-05-19 PROCEDURE — 80053 COMPREHEN METABOLIC PANEL: CPT

## 2025-05-19 PROCEDURE — 93005 ELECTROCARDIOGRAM TRACING: CPT

## 2025-05-19 PROCEDURE — P9612 CATHETERIZE FOR URINE SPEC: HCPCS

## 2025-05-19 PROCEDURE — 84484 ASSAY OF TROPONIN QUANT: CPT

## 2025-05-19 PROCEDURE — 87086 URINE CULTURE/COLONY COUNT: CPT | Mod: GEALAB

## 2025-05-19 PROCEDURE — 71046 X-RAY EXAM CHEST 2 VIEWS: CPT | Mod: FOREIGN READ | Performed by: RADIOLOGY

## 2025-05-19 PROCEDURE — 36415 COLL VENOUS BLD VENIPUNCTURE: CPT

## 2025-05-19 PROCEDURE — 83735 ASSAY OF MAGNESIUM: CPT

## 2025-05-19 PROCEDURE — 71046 X-RAY EXAM CHEST 2 VIEWS: CPT

## 2025-05-19 PROCEDURE — 2500000002 HC RX 250 W HCPCS SELF ADMINISTERED DRUGS (ALT 637 FOR MEDICARE OP, ALT 636 FOR OP/ED)

## 2025-05-19 PROCEDURE — 81001 URINALYSIS AUTO W/SCOPE: CPT

## 2025-05-19 PROCEDURE — 85025 COMPLETE CBC W/AUTO DIFF WBC: CPT

## 2025-05-19 PROCEDURE — 99285 EMERGENCY DEPT VISIT HI MDM: CPT | Mod: 25 | Performed by: EMERGENCY MEDICINE

## 2025-05-19 PROCEDURE — 96361 HYDRATE IV INFUSION ADD-ON: CPT

## 2025-05-19 PROCEDURE — 84443 ASSAY THYROID STIM HORMONE: CPT

## 2025-05-19 PROCEDURE — 96374 THER/PROPH/DIAG INJ IV PUSH: CPT

## 2025-05-19 PROCEDURE — 2500000004 HC RX 250 GENERAL PHARMACY W/ HCPCS (ALT 636 FOR OP/ED): Mod: JZ

## 2025-05-19 RX ORDER — KETOROLAC TROMETHAMINE 15 MG/ML
15 INJECTION, SOLUTION INTRAMUSCULAR; INTRAVENOUS ONCE
Status: COMPLETED | OUTPATIENT
Start: 2025-05-19 | End: 2025-05-19

## 2025-05-19 RX ORDER — POTASSIUM CHLORIDE 20 MEQ/1
40 TABLET, EXTENDED RELEASE ORAL ONCE
Status: COMPLETED | OUTPATIENT
Start: 2025-05-19 | End: 2025-05-19

## 2025-05-19 RX ADMIN — POTASSIUM CHLORIDE 40 MEQ: 1500 TABLET, EXTENDED RELEASE ORAL at 07:40

## 2025-05-19 RX ADMIN — SODIUM CHLORIDE 1000 ML: 0.9 INJECTION, SOLUTION INTRAVENOUS at 07:40

## 2025-05-19 RX ADMIN — KETOROLAC TROMETHAMINE 15 MG: 15 INJECTION, SOLUTION INTRAMUSCULAR; INTRAVENOUS at 07:26

## 2025-05-19 ASSESSMENT — HEART SCORE
AGE: 65+
RISK FACTORS: 1-2 RISK FACTORS
HEART SCORE: 3
HISTORY: SLIGHTLY SUSPICIOUS
AGE: 65+
HISTORY: SLIGHTLY SUSPICIOUS
ECG: NORMAL
TROPONIN: LESS THAN OR EQUAL TO NORMAL LIMIT
ECG: NORMAL

## 2025-05-19 ASSESSMENT — PAIN DESCRIPTION - LOCATION: LOCATION: CHEST

## 2025-05-19 ASSESSMENT — PAIN DESCRIPTION - DIRECTION: RADIATING_TOWARDS: TO BACK

## 2025-05-19 ASSESSMENT — PAIN SCALES - GENERAL: PAINLEVEL_OUTOF10: 7

## 2025-05-19 ASSESSMENT — ACTIVITIES OF DAILY LIVING (ADL): LACK_OF_TRANSPORTATION: NO

## 2025-05-19 ASSESSMENT — PAIN DESCRIPTION - ORIENTATION: ORIENTATION: LEFT

## 2025-05-19 ASSESSMENT — PAIN DESCRIPTION - FREQUENCY: FREQUENCY: CONSTANT/CONTINUOUS

## 2025-05-19 ASSESSMENT — PAIN DESCRIPTION - PAIN TYPE: TYPE: ACUTE PAIN

## 2025-05-19 ASSESSMENT — PAIN - FUNCTIONAL ASSESSMENT: PAIN_FUNCTIONAL_ASSESSMENT: 0-10

## 2025-05-19 NOTE — PROGRESS NOTES
05/19/25 0951   Discharge Planning   Living Arrangements Alone  (Lives in senior living apartments)   Support Systems Friends/neighbors   Assistance Needed A&OX4; independent with ADLs with cane at times; drives; room air baseline and currently room air; PCP Julia Negro CNP   Type of Residence Private residence   Number of Stairs to Enter Residence 0   Number of Stairs Within Residence 0   Do you have animals or pets at home? No   Who is requesting discharge planning? Provider   Expected Discharge Disposition Home  (Patient denies home going needs)   Does the patient need discharge transport arranged? No   Financial Resource Strain   How hard is it for you to pay for the very basics like food, housing, medical care, and heating? Not hard   Housing Stability   In the last 12 months, was there a time when you were not able to pay the mortgage or rent on time? N   In the past 12 months, how many times have you moved where you were living? 0   At any time in the past 12 months, were you homeless or living in a shelter (including now)? N   Transportation Needs   In the past 12 months, has lack of transportation kept you from medical appointments or from getting medications? no   In the past 12 months, has lack of transportation kept you from meetings, work, or from getting things needed for daily living? No   Intensity of Service   Intensity of Service 0-30 min     05/19/2025 0954am  Spoke with patient bedside in ED

## 2025-05-19 NOTE — ED PROVIDER NOTES
The patient was seen by the midlevel/resident.  I have personally saw the patient and made/approved the management plan and take responsibility for the patient management.  I reviewed the EKG's (when done) and agree with the interpretation.  I have seen and examined the patient; agree with the workup, evaluation, MDM, and diagnosis.  The care plan has been discussed with the midlevel/resident; I have reviewed the note and agree with the documented findings.     Danisha is a 84-year-old woman who presents with chest and back pain.  She has a history of coronary disease with a pacemaker and is on Eliquis.  She had previous cardiac ablations as well TIA diverticulitis A-fib and a hemicolectomy.  She currently has some diverticulitis that is being treated with antibiotics and has caused her to have some loose stool as well.  She sees her GI doctor, Dr. Trevino and was seen earlier this month.  Currently she is concerned with her chest and upper back pain.  Pain started yesterday seems to come and go.  Worse when she pushes on her pacemaker site.  Since having her pacemaker replaced in January it has been tender when she touches the site and she and her cardiologist are watching it.  Patient's been taking Tylenol with minimal improvement.  Will try some Toradol and work her up for ACS.  Not really finding signs of infection.  Does not appear to be related to her GI issues.  Doubt pulmonary embolus.  Awaiting workup and disposition.    Work up was negative.  Pacemaker was interrogated and is not having any events.  Patient has a low heart score will be discharged home to return occasions and follow-up instructions.  Diagnoses as of 05/19/25 1140   Chest pain, unspecified type     MD Gee Marinelli MD  05/19/25 1140

## 2025-05-19 NOTE — ED TRIAGE NOTES
BIBA for left chest back that radiates to upper back. Patient had pacemaker placed in January 2025. Nitro and aspirin given by EMS.

## 2025-05-19 NOTE — ED PROVIDER NOTES
Emergency Department Provider Note          History of Present Illness     CC: Chest Pain (BIBA for left chest back that radiates to upper back. Patient had pacemaker placed in January 2025. Nitro and aspirin given by EMS. )     History provided by: Patient  Limitations to History: None    HPI:   Danisha Marsh is a 84 y.o.female with PMH colon cancer s/p right hemicolectomy in 2007, diverticulitis A-fib, TIA and CAD. Patient is s/p pacemaker implant 11/11/2013 and cardiac ablations x3  presenting to the Emergency Department for back pain that radiates into her chest. She said it has been off and on for the past couple days but was worse this morning. She characterizes the pain as a dull, constant ache. The patient also mentions diarrhea for the past couple days. Denies sob, n/v, or fever.    Records Reviewed: Recent available ED and inpatient notes reviewed in EMR.    PMHx/PSHx:  Per HPI.   - has a past medical history of Allergic, Arthritis, Blood in stool, Cancer (Multi), Cataract, Disease of thyroid gland, Diverticulitis, Eczema, Heart disease, Hypertension, Inflammatory bowel disease, Urinary tract infection, Varicella, and Visual impairment.  - has a past surgical history that includes Back surgery (1973); Cardiac catheterization; Colon surgery (2007); Coronary stent placement (2018); Cardiac surgery (ablasion & pace maker for afib); Hernia repair (2007); and Joint replacement (2019).  - has Uncontrolled hypertension; Trochanteric bursitis of both hips; Traumatic hematoma of left lower leg; Spinal stenosis, lumbar region, without neurogenic claudication; Lumbar radiculopathy; Precordial chest pain; Post-nasal drip; Paroxysmal atrial tachycardia (CMS-HCC); Paroxysmal atrial fibrillation (Multi); Obesity, Class I, BMI 30-34.9; Normocytic anemia; Mixed hyperlipidemia; Lung nodules; Lumbosacral spondylosis without myelopathy; Hypothyroidism; Hyponatremia; Gait instability; Fascial hernia; Diverticulitis;  Diaphragmatic hernia; Coronary artery disease involving native coronary artery of native heart; Chronic bilateral low back pain without sciatica; Cardiac pacemaker in situ; Calculus of gallbladder without cholecystitis without obstruction; Biliary colic; Benign essential hypertension; Arthritis; Routine adult health maintenance; Enlarged thyroid; Abnormal CT scan of head; Primary osteoarthritis involving multiple joints; History of skin cancer; Anxiety; Bilateral leg edema; Cataract of both eyes; Bleeding hemorrhoid; Labial irritation; and Burning with urination on their problem list.    Medications:  Current Outpatient Medications   Medication Instructions    acetaminophen (TYLENOL) 650 mg, Every 6 hours PRN    amiodarone (Pacerone) 200 mg tablet 1 tablet, Daily (0630)    amLODIPine (NORVASC) 5 mg, Every 12 hours scheduled (0630,1830)    amoxicillin-clavulanate (Augmentin) 500-125 mg tablet 1 tablet, oral, 2 times daily    azelastine-fluticasone (Dymista) 137-50 mcg/spray nasal spray 1 spray, Each Nostril, 2 times daily, Use in each nostril as directed    clindamycin (Cleocin) 300 mg capsule Take two capsules one hour before your dentist appointment    Eliquis 5 mg, Every 12 hours scheduled (0630,1830)    guaiFENesin (ROBITUSSIN) 200 mg, 3 times daily PRN    indapamide (LOZOL) 2.5 mg, oral, Daily    irbesartan (AVAPRO) 150 mg, oral, 2 times daily    methIMAzole (Tapazole) 5 mg tablet 1 tab daily then 1 tab in the evening every other day    metoprolol tartrate (LOPRESSOR) 50 mg, oral, Every 12 hours scheduled (0630,1830)    nystatin (Mycostatin) 100,000 unit/gram powder 1 Application, Topical, 2 times daily    nystatin (Mycostatin) cream Topical, As needed    penicillin v potassium (VEETID) 2,000 mg    rosuvastatin (CRESTOR) 20 mg, Nightly        Allergies:  Shellfish containing products, Shellfish derived, Adhesive, Augmentin [amoxicillin-pot clavulanate], Erythromycin base, Oxycodone-acetaminophen, Ragweed,  Adhesive tape-silicones, Carbamates, and Latex    Social History:  - Tobacco:  reports that she quit smoking about 57 years ago. Her smoking use included cigarettes. She has a 2.5 pack-year smoking history. She has never used smokeless tobacco.   - Alcohol:  reports no history of alcohol use.   - Illicit Drugs:  reports no history of drug use.     ROS:  Per HPI.       Physical Exam     Triage Vitals:  T 36 °C (96.8 °F)  HR 80  /66  RR 20  O2 100 % None (Room air)    General: Awake, alert, in no acute distress  Eyes: Gaze conjugate.  No scleral icterus or injection  HENT: Normo-cephalic, atraumatic. No stridor  CV: Regular rate, regular rhythm. Radial pulses 2+ bilaterally  Resp: Breathing non-labored, speaking in full sentences.  Clear to auscultation bilaterally  GI: Soft, non-distended, Diffusely tender. No rebound or guarding.  : Deferred  MSK/Extremities: No gross bony deformities. Moving all extremities. Back and chest tender to palpation.  Skin: Warm. Appropriate color  Neuro: Alert. Oriented. Face symmetric. Speech is fluent.  Gross strength and sensation intact in b/l UE and LEs  Psych: Appropriate mood and affect          No data recorded                  Medical Decision Making & ED Course     EKG: EKG interpreted by myself. If applicable, please see ED Course for full interpretation.    Medical Decision Making   Danisha Marsh is a 84 y.o.female presenting to the Emergency Department for Chest pain. Due to ongoing pain for multiple days and reproduction of pain with palpation this points away from a cardiac cause. EKG was reassuring as well EKG NSR with incomplete RBB. It is relatively similar EKG when compared to the EKG taken on July 2024. Troponins were taken to assess a cardiac cause.  CXR ordered to assess for any structural abnormalities that could be causing her pain. CBC taken to investigate an infecitous cause. CMP revealed a sodium of 130 and potassium low at 3.3. This can be  explained by her diarrhea. Potassium was repleted and 1L of NS started.    CXR rvealed hyperinflation and mild bilateral basilar liniear airspace disease suggestive of atlectasis.    15mg IV toradol resolved her chest pain. Her pacemaker was instigated and no observations were seen.    Workup for chest pain benign. Pt stable for discharge. Instructed to follow up with cardiologist.  Diagnoses as of 05/19/25 0708   Chest pain, unspecified type       Independent Result Review and Interpretation: Relevant laboratory and radiographic results were reviewed and independently interpreted by myself.  As necessary, they are commented on in the ED Course.    Chronic conditions affecting the patient's care: As documented above in MDM.    Social Determinants of Health which Significantly Impact Care:   None identified      Disposition   Discharge    Workup for chest pain benign. Pt stable for discharge. Instructed to follow up with cardiologist.      Procedures     Procedures ? SmartLinks last updated 5/19/2025 6:40 AM        Tal Cox DO  Resident  05/19/25 5057

## 2025-05-20 LAB — BACTERIA UR CULT: NORMAL

## 2025-05-24 LAB
ATRIAL RATE: 79 BPM
P AXIS: -2 DEGREES
P OFFSET: 180 MS
P ONSET: 115 MS
PR INTERVAL: 204 MS
Q ONSET: 217 MS
QRS COUNT: 13 BEATS
QRS DURATION: 108 MS
QT INTERVAL: 420 MS
QTC CALCULATION(BAZETT): 481 MS
QTC FREDERICIA: 460 MS
R AXIS: -20 DEGREES
T AXIS: 8 DEGREES
T OFFSET: 427 MS
VENTRICULAR RATE: 79 BPM

## 2025-05-29 ENCOUNTER — APPOINTMENT (OUTPATIENT)
Dept: RADIOLOGY | Facility: HOSPITAL | Age: 85
End: 2025-05-29
Payer: MEDICARE

## 2025-05-29 ENCOUNTER — HOSPITAL ENCOUNTER (EMERGENCY)
Facility: HOSPITAL | Age: 85
Discharge: HOME | End: 2025-05-29
Attending: EMERGENCY MEDICINE
Payer: MEDICARE

## 2025-05-29 ENCOUNTER — APPOINTMENT (OUTPATIENT)
Dept: CARDIOLOGY | Facility: HOSPITAL | Age: 85
End: 2025-05-29
Payer: MEDICARE

## 2025-05-29 VITALS
HEART RATE: 64 BPM | RESPIRATION RATE: 25 BRPM | SYSTOLIC BLOOD PRESSURE: 156 MMHG | HEIGHT: 65 IN | TEMPERATURE: 96.8 F | BODY MASS INDEX: 34.66 KG/M2 | WEIGHT: 208 LBS | DIASTOLIC BLOOD PRESSURE: 107 MMHG | OXYGEN SATURATION: 100 %

## 2025-05-29 DIAGNOSIS — N28.1 KIDNEY CYSTS: ICD-10-CM

## 2025-05-29 DIAGNOSIS — E87.1 HYPONATREMIA: ICD-10-CM

## 2025-05-29 DIAGNOSIS — K57.92 DIVERTICULITIS: Primary | ICD-10-CM

## 2025-05-29 LAB
ALBUMIN SERPL BCP-MCNC: 3.7 G/DL (ref 3.4–5)
ALP SERPL-CCNC: 91 U/L (ref 33–136)
ALT SERPL W P-5'-P-CCNC: 25 U/L (ref 7–45)
ANION GAP SERPL CALC-SCNC: 15 MMOL/L (ref 10–20)
APPEARANCE UR: CLEAR
AST SERPL W P-5'-P-CCNC: 33 U/L (ref 9–39)
BASOPHILS # BLD AUTO: 0.05 X10*3/UL (ref 0–0.1)
BASOPHILS NFR BLD AUTO: 0.5 %
BILIRUB DIRECT SERPL-MCNC: 0.1 MG/DL (ref 0–0.3)
BILIRUB SERPL-MCNC: 0.7 MG/DL (ref 0–1.2)
BILIRUB UR STRIP.AUTO-MCNC: NEGATIVE MG/DL
BNP SERPL-MCNC: 104 PG/ML (ref 0–99)
BUN SERPL-MCNC: 14 MG/DL (ref 6–23)
CALCIUM SERPL-MCNC: 8.8 MG/DL (ref 8.6–10.3)
CHLORIDE SERPL-SCNC: 91 MMOL/L (ref 98–107)
CO2 SERPL-SCNC: 25 MMOL/L (ref 21–32)
COLOR UR: NORMAL
CREAT SERPL-MCNC: 0.68 MG/DL (ref 0.5–1.05)
EGFRCR SERPLBLD CKD-EPI 2021: 87 ML/MIN/1.73M*2
EOSINOPHIL # BLD AUTO: 0.05 X10*3/UL (ref 0–0.4)
EOSINOPHIL NFR BLD AUTO: 0.5 %
ERYTHROCYTE [DISTWIDTH] IN BLOOD BY AUTOMATED COUNT: 14.1 % (ref 11.5–14.5)
GLUCOSE SERPL-MCNC: 105 MG/DL (ref 74–99)
GLUCOSE UR STRIP.AUTO-MCNC: NORMAL MG/DL
HCT VFR BLD AUTO: 37.5 % (ref 36–46)
HGB BLD-MCNC: 12.9 G/DL (ref 12–16)
HOLD SPECIMEN: NORMAL
IMM GRANULOCYTES # BLD AUTO: 0.04 X10*3/UL (ref 0–0.5)
IMM GRANULOCYTES NFR BLD AUTO: 0.4 % (ref 0–0.9)
KETONES UR STRIP.AUTO-MCNC: NEGATIVE MG/DL
LACTATE SERPL-SCNC: 1 MMOL/L (ref 0.4–2)
LACTATE SERPL-SCNC: 2.1 MMOL/L (ref 0.4–2)
LEUKOCYTE ESTERASE UR QL STRIP.AUTO: NEGATIVE
LIPASE SERPL-CCNC: 18 U/L (ref 9–82)
LYMPHOCYTES # BLD AUTO: 0.79 X10*3/UL (ref 0.8–3)
LYMPHOCYTES NFR BLD AUTO: 8.5 %
MAGNESIUM SERPL-MCNC: 1.71 MG/DL (ref 1.6–2.4)
MCH RBC QN AUTO: 31.3 PG (ref 26–34)
MCHC RBC AUTO-ENTMCNC: 34.4 G/DL (ref 32–36)
MCV RBC AUTO: 91 FL (ref 80–100)
MONOCYTES # BLD AUTO: 0.69 X10*3/UL (ref 0.05–0.8)
MONOCYTES NFR BLD AUTO: 7.4 %
NEUTROPHILS # BLD AUTO: 7.7 X10*3/UL (ref 1.6–5.5)
NEUTROPHILS NFR BLD AUTO: 82.7 %
NITRITE UR QL STRIP.AUTO: NEGATIVE
NRBC BLD-RTO: 0 /100 WBCS (ref 0–0)
PH UR STRIP.AUTO: 7 [PH]
PLATELET # BLD AUTO: 209 X10*3/UL (ref 150–450)
POTASSIUM SERPL-SCNC: 4.2 MMOL/L (ref 3.5–5.3)
PROT SERPL-MCNC: 6.7 G/DL (ref 6.4–8.2)
PROT UR STRIP.AUTO-MCNC: NORMAL MG/DL
RBC # BLD AUTO: 4.12 X10*6/UL (ref 4–5.2)
RBC # UR STRIP.AUTO: NEGATIVE MG/DL
RBC #/AREA URNS AUTO: NORMAL /HPF
SODIUM SERPL-SCNC: 127 MMOL/L (ref 136–145)
SP GR UR STRIP.AUTO: 1.02
SQUAMOUS #/AREA URNS AUTO: NORMAL /HPF
UROBILINOGEN UR STRIP.AUTO-MCNC: NORMAL MG/DL
WBC # BLD AUTO: 9.3 X10*3/UL (ref 4.4–11.3)
WBC #/AREA URNS AUTO: NORMAL /HPF
WBC CLUMPS #/AREA URNS AUTO: NORMAL /HPF

## 2025-05-29 PROCEDURE — 71045 X-RAY EXAM CHEST 1 VIEW: CPT

## 2025-05-29 PROCEDURE — 80053 COMPREHEN METABOLIC PANEL: CPT | Performed by: EMERGENCY MEDICINE

## 2025-05-29 PROCEDURE — 83690 ASSAY OF LIPASE: CPT | Performed by: EMERGENCY MEDICINE

## 2025-05-29 PROCEDURE — 85025 COMPLETE CBC W/AUTO DIFF WBC: CPT | Performed by: EMERGENCY MEDICINE

## 2025-05-29 PROCEDURE — 96374 THER/PROPH/DIAG INJ IV PUSH: CPT | Mod: 59

## 2025-05-29 PROCEDURE — 83880 ASSAY OF NATRIURETIC PEPTIDE: CPT | Performed by: EMERGENCY MEDICINE

## 2025-05-29 PROCEDURE — 36415 COLL VENOUS BLD VENIPUNCTURE: CPT | Performed by: EMERGENCY MEDICINE

## 2025-05-29 PROCEDURE — 96361 HYDRATE IV INFUSION ADD-ON: CPT

## 2025-05-29 PROCEDURE — 71045 X-RAY EXAM CHEST 1 VIEW: CPT | Performed by: RADIOLOGY

## 2025-05-29 PROCEDURE — 83605 ASSAY OF LACTIC ACID: CPT | Performed by: EMERGENCY MEDICINE

## 2025-05-29 PROCEDURE — 83735 ASSAY OF MAGNESIUM: CPT | Performed by: EMERGENCY MEDICINE

## 2025-05-29 PROCEDURE — 74177 CT ABD & PELVIS W/CONTRAST: CPT

## 2025-05-29 PROCEDURE — 82248 BILIRUBIN DIRECT: CPT | Performed by: EMERGENCY MEDICINE

## 2025-05-29 PROCEDURE — 2500000004 HC RX 250 GENERAL PHARMACY W/ HCPCS (ALT 636 FOR OP/ED): Performed by: EMERGENCY MEDICINE

## 2025-05-29 PROCEDURE — 2550000001 HC RX 255 CONTRASTS: Performed by: EMERGENCY MEDICINE

## 2025-05-29 PROCEDURE — 93005 ELECTROCARDIOGRAM TRACING: CPT

## 2025-05-29 PROCEDURE — 81001 URINALYSIS AUTO W/SCOPE: CPT | Performed by: EMERGENCY MEDICINE

## 2025-05-29 PROCEDURE — 99285 EMERGENCY DEPT VISIT HI MDM: CPT | Mod: 25 | Performed by: EMERGENCY MEDICINE

## 2025-05-29 RX ORDER — BISMUTH SUBSALICYLATE 262 MG
1 TABLET,CHEWABLE ORAL DAILY
COMMUNITY

## 2025-05-29 RX ORDER — ONDANSETRON 4 MG/1
4 TABLET, ORALLY DISINTEGRATING ORAL EVERY 8 HOURS PRN
Qty: 20 TABLET | Refills: 0 | Status: SHIPPED | OUTPATIENT
Start: 2025-05-29 | End: 2025-06-05

## 2025-05-29 RX ORDER — BUTYROSPERMUM PARKII(SHEA BUTTER), SIMMONDSIA CHINENSIS (JOJOBA) SEED OIL, ALOE BARBADENSIS LEAF EXTRACT .01; 1; 3.5 G/100G; G/100G; G/100G
250 LIQUID TOPICAL 2 TIMES DAILY
COMMUNITY

## 2025-05-29 RX ORDER — AMOXICILLIN AND CLAVULANATE POTASSIUM 500; 125 MG/1; MG/1
1 TABLET, FILM COATED ORAL 2 TIMES DAILY
Qty: 14 TABLET | Refills: 0 | Status: SHIPPED | OUTPATIENT
Start: 2025-05-29 | End: 2025-06-05

## 2025-05-29 RX ORDER — ONDANSETRON HYDROCHLORIDE 2 MG/ML
4 INJECTION, SOLUTION INTRAVENOUS ONCE
Status: COMPLETED | OUTPATIENT
Start: 2025-05-29 | End: 2025-05-29

## 2025-05-29 RX ADMIN — SODIUM CHLORIDE 500 ML: 0.9 INJECTION, SOLUTION INTRAVENOUS at 07:57

## 2025-05-29 RX ADMIN — IOHEXOL 75 ML: 350 INJECTION, SOLUTION INTRAVENOUS at 07:48

## 2025-05-29 RX ADMIN — ONDANSETRON 4 MG: 2 INJECTION, SOLUTION INTRAMUSCULAR; INTRAVENOUS at 07:19

## 2025-05-29 ASSESSMENT — PAIN - FUNCTIONAL ASSESSMENT: PAIN_FUNCTIONAL_ASSESSMENT: 0-10

## 2025-05-29 ASSESSMENT — ACTIVITIES OF DAILY LIVING (ADL): LACK_OF_TRANSPORTATION: NO

## 2025-05-29 ASSESSMENT — PAIN SCALES - GENERAL: PAINLEVEL_OUTOF10: 7

## 2025-05-29 ASSESSMENT — PAIN DESCRIPTION - LOCATION: LOCATION: BACK

## 2025-05-29 NOTE — PROGRESS NOTES
Pharmacy Medication History Review    Danisha Marsh is a 85 y.o. female admitted for No Principal Problem: There is no principal problem currently on the Problem List. Please update the Problem List and refresh.. Pharmacy reviewed the patient's [id number] medications and allergies for accuracy.    The list below reflectives the updated PTA list. Please review each medication in order reconciliation for additional clarification and justification.  Prior to Admission Medications   Prescriptions Last Dose Informant Patient Reported? Taking?   Eliquis 5 mg tablet 2025 Morning Self Yes Yes   Sig: Take 1 tablet (5 mg) by mouth every 12 hours.   acetaminophen (Tylenol) 325 mg tablet 2025 Morning Self Yes Yes   Sig: Take 2 tablets (650 mg) by mouth every 6 hours if needed for moderate pain (4 - 6).   amLODIPine (Norvasc) 5 mg tablet 2025 Bedtime Self Yes Yes   Sig: Take 1 tablet (5 mg) by mouth every 12 hours.   amiodarone (Pacerone) 200 mg tablet 2025 Self Yes Yes   Sig: Take 1 tablet (200 mg) by mouth early in the morning..   amoxicillin-clavulanate (Augmentin) 500-125 mg tablet   No No   Sig: Take 1 tablet by mouth 2 times a day for 7 days.   azelastine-fluticasone (Dymista) 137-50 mcg/spray nasal spray Past Week Self No Yes   Sig: Administer 1 spray into each nostril 2 times a day. Use in each nostril as directed   guaiFENesin (Robitussin) 100 mg/5 mL syrup Past Week Morning Self Yes Yes   Sig: Take 10 mL (200 mg) by mouth 3 times a day as needed for cough.   indapamide (Lozol) 2.5 mg tablet 2025 Morning Self No Yes   Sig: Take 1 tablet (2.5 mg) by mouth once daily.   irbesartan (Avapro) 300 mg tablet 2025 Morning Self No Yes   Sig: Take 0.5 tablets (150 mg) by mouth 2 times a day.   methIMAzole (Tapazole) 5 mg tablet 2025 Morning Self No Yes   Si tab daily then 1 tab in the evening every other day   metoprolol tartrate (Lopressor) 50 mg tablet 2025 Morning Self No  Yes   Sig: Take 1 tablet by mouth every 12 hours.   multivitamin tablet 5/29/2025 Morning Self Yes Yes   Sig: Take 1 tablet by mouth once daily.   nystatin (Mycostatin) 100,000 unit/gram powder 5/29/2025 Morning Self No Yes   Sig: Apply 1 Application topically 2 times a day.   nystatin (Mycostatin) cream Not Taking Self No No   Sig: Apply topically if needed (Applied to affected area twice daily as needed).   Patient not taking: Reported on 5/29/2025   rosuvastatin (Crestor) 20 mg tablet 5/29/2025 Morning Self Yes Yes   Sig: Take 1 tablet (20 mg) by mouth once daily at bedtime.   saccharomyces boulardii (Florastor) 250 mg capsule 5/29/2025 Morning Self Yes Yes   Sig: Take 1 capsule (250 mg) by mouth 2 times a day.      Facility-Administered Medications: None            The list below reflectives the updated allergy list. Please review each documented allergy for additional clarification and justification.  Allergies  Reviewed by Gee Marcelo on 5/29/2025        Severity Reactions Comments    Erythromycin Base High Other Pt is on amiodarone. Contraindicated. pt is on Amiodarone and can't take that with Erythromycin.    Shellfish Containing Products High Shortness of breath **CAN/HAS HAD CT w/ Contrast per pt** SOB is intermittent per pt.     Shellfish Derived High Shortness of breath     Augmentin [amoxicillin-pot Clavulanate] Medium Nausea/vomiting     Adhesive Low Other REDNESS OF SKIN    Adhesive Tape-silicones Low Rash     Carbamates Low Palpitations, Other skelaxin-palpitations    Latex Low Rash     Oxycodone-acetaminophen Low Nausea/vomiting     Ragweed Low Itching rhinitis            Below are additional concerns with the patient's PTA list.      GEE MARCELO

## 2025-05-29 NOTE — ED PROVIDER NOTES
"Danisha Marsh  85 y.o.    HPI  Presents to the ED with concern for left lower back pain and bilateral lower abdominal pain.  Symptoms started yesterday evening.  She reports that during the day yesterday she was feeling fine in her usual self.  States that throughout the night she had multiple episodes of loose nonbloody stool which is unusual for her.  Pain in the lower back is on the left side.  No midline pain.  No new weakness numbness or tingling in the extremities.  States that she has been urinating a little more frequently overnight but no dysuria.  No hematuria.  No melena.  No chest pain or shortness of breath.  No fevers.  Some nausea without vomiting.  No new unusual activities in the past 24 hours.  No new unusual foods.  The patient tried a Voltaren gel on her lower back.  She also tried Robaxin.  She states she has a history of lower back pain but this was a little more intense than usual.  She has a history of colon cancer and is status post colectomy with about a foot of bowel removed.  She also has a history of A-fib and is on Eliquis and has a pacemaker.  States that her bilateral lower extremities are little bit more swollen than normal and tender to the touch.  She has been taking her antihypertensives including her Norvasc as prescribed.  Patient has a history of diverticulitis and states that she is concerned she has diverticulitis again today.    Patient History   Medical History[1]  Surgical History[2]  Family History[3]  Social History[4]    Physical Exam   Vitals:    05/29/25 0639   BP: 167/69   Pulse: 85   Resp: 20   Temp: 36 °C (96.8 °F)   SpO2: 99%   Weight: 94.3 kg (208 lb)   Height: 1.651 m (5' 5\")        Constitutional: NAD, non-toxic  Eyes: PERRL, Conjunctiva normal, No discharge  HEENT: Atraumatic. External ears appear normal, Nose is without drainage, MMM, no intraoral lesions  Neck: Normal ROM, No lymphadenopathy, No stridor  Cardiac: Regular rhythm and rate  Pulmonary/Chest: " No respiratory distress, Normal breath sounds, No chest tenderness  Abdomen: BS present, Soft, some mild bilateral lower quadrant tenderness to palpation  Back: No midline tenderness, some left lumbar paraspinous tenderness to palpation.  There is no crepitus or contusions or unusual skin findings in that area.  No contusions  Extremities: Normal ROM, there is about +1 bilateral lower extremity edema with some mild tenderness to palpation, intact distal pulses  Skin: Warm and dry, No rashes, No erythema  Neurologic: Alert and oriented x 3, Speech clear/fluent. Normal motor function, Normal sensation, No focal neurologic deficits  Psychiatric: Normal affect, Normal judgement, Normal mood      EKG interpreted by me: Sinus rhythm at a rate of 63.  No ST segment elevation concerning for acute MI.  There is a first-degree AV block with a RI interval of 210.  QTc is 474.  There is an incomplete right bundle branch block.  I did compare this to the patient's previous EKG from May 19, 2025 and the complexes overall appear very similar.    ED Course & MDM       This is an 85-year-old female with a history of hypertension, lumbar radiculopathy, paroxysmal A-fib status post pacemaker, CAD status post stent, on Eliquis, history of diverticulitis who comes the emergency department with left lower back pain and bilateral lower abdominal pain since last night with associated loose nonbloody stool and nausea.  On exam she is alert and pleasant in no acute distress.  She does have some tenderness to palpation across the bilateral lower abdomen and slightly in the left lower back.  Basic blood work was obtained as well as a chest x-ray and CT scan of the abdomen and pelvis.  Medicated for nausea.  Sodium was 127.  CT scan per chart is consistent with uncomplicated sigmoid diverticulitis.  I printed the CT scan and reviewed the results with the patient.  I discussed the gallstone which she states she knows about.  She also knows about  the cyst on the kidneys.  Also aware of the lumbar pathology.  I discussed the low sodium.  Patient states that she has a history of low sodium.  She has had this her entire life.  I did look back in the patient does tend to run in the high 20s/low 30s.  I did offer hospitalization and engaged the patient in informed decision making.  She states that she is feeling okay and prefers discharge.  We discussed antibiotics.  She states she has taken Augmentin previously but at a dose of 500.  She states that she would prefer the lower dose of 500 twice a day compared to the recommended higher dose at a increased frequency.  This was sent to her pharmacy.  Return precautions were discussed and the patient was welcome back at any point for new or worsening symptoms.  She was agreeable and appreciative.  Patient voiced no further concerns or issues.      Impression   Diagnoses as of 05/29/25 1021   Diverticulitis   Kidney cysts   Hyponatremia        Disposition  Discharge             [1]  Past Medical History:  Diagnosis Date   • Allergic    • Arthritis    • Blood in stool 12/27/2022   • Cancer (Multi) 2007   • Cataract 2024   • Disease of thyroid gland    • Diverticulitis 12/27/2022   • Eczema    • Heart disease stent 2018   • Hypertension    • Inflammatory bowel disease    • Urinary tract infection    • Varicella    • Visual impairment    [2]  Past Surgical History:  Procedure Laterality Date   • BACK SURGERY  1973   • CARDIAC CATHETERIZATION     • CARDIAC SURGERY  ablasion & pace maker for afib   • COLON SURGERY  2007   • CORONARY STENT PLACEMENT  2018   • HERNIA REPAIR  2007   • JOINT REPLACEMENT  2019   [3]  Family History  Problem Relation Name Age of Onset   • Cancer Mother Nereyda Yen    • Colon cancer Mother Nereyda Yen    • Heart disease Father Juanito Yen    • Colon cancer Maternal Grandmother     [4]  Social History  Tobacco Use   • Smoking status: Former     Current packs/day: 0.00     Average packs/day: 0.5  packs/day for 5.0 years (2.5 ttl pk-yrs)     Types: Cigarettes     Quit date: 1968     Years since quittin.4   • Smokeless tobacco: Never   Vaping Use   • Vaping status: Never Used   Substance Use Topics   • Alcohol use: Never   • Drug use: Never      Yuval Dempsey MD  25 1021

## 2025-05-29 NOTE — PROGRESS NOTES
05/29/25 0850   Discharge Planning   Living Arrangements Alone  (lives at senior living apartments)   Support Systems Friends/neighbors   Assistance Needed A&OX4; independent with ADLs with cane at times; drives; room air baseline and currently room air; PCP Julia Negro CNP; on Eliquis prior to hospitalization   Type of Residence Private residence   Number of Stairs to Enter Residence 0   Number of Stairs Within Residence 0   Do you have animals or pets at home? No   Who is requesting discharge planning? Provider   Expected Discharge Disposition Home  (Pending workup but likely home no needs)   Does the patient need discharge transport arranged? Yes   RoundTrip coordination needed? Yes   Has discharge transport been arranged? No   Financial Resource Strain   How hard is it for you to pay for the very basics like food, housing, medical care, and heating? Not hard   Housing Stability   In the last 12 months, was there a time when you were not able to pay the mortgage or rent on time? N   In the past 12 months, how many times have you moved where you were living? 0   At any time in the past 12 months, were you homeless or living in a shelter (including now)? N   Transportation Needs   In the past 12 months, has lack of transportation kept you from medical appointments or from getting medications? no   In the past 12 months, has lack of transportation kept you from meetings, work, or from getting things needed for daily living? No   Intensity of Service   Intensity of Service 0-30 min     05/29/2025 0854am  Spoke with patient bedside in ED

## 2025-05-30 LAB
ATRIAL RATE: 63 BPM
P OFFSET: 168 MS
P ONSET: 118 MS
PR INTERVAL: 210 MS
Q ONSET: 223 MS
QRS COUNT: 10 BEATS
QRS DURATION: 108 MS
QT INTERVAL: 464 MS
QTC CALCULATION(BAZETT): 474 MS
QTC FREDERICIA: 471 MS
R AXIS: 13 DEGREES
T AXIS: 36 DEGREES
T OFFSET: 455 MS
VENTRICULAR RATE: 63 BPM

## 2025-06-04 ENCOUNTER — APPOINTMENT (OUTPATIENT)
Dept: ENDOCRINOLOGY | Facility: CLINIC | Age: 85
End: 2025-06-04
Payer: COMMERCIAL

## 2025-06-04 VITALS
HEART RATE: 54 BPM | WEIGHT: 209 LBS | DIASTOLIC BLOOD PRESSURE: 65 MMHG | BODY MASS INDEX: 34.78 KG/M2 | SYSTOLIC BLOOD PRESSURE: 144 MMHG

## 2025-06-04 DIAGNOSIS — E05.90 HYPERTHYROIDISM: Primary | ICD-10-CM

## 2025-06-04 PROCEDURE — 1159F MED LIST DOCD IN RCRD: CPT | Performed by: INTERNAL MEDICINE

## 2025-06-04 PROCEDURE — 1160F RVW MEDS BY RX/DR IN RCRD: CPT | Performed by: INTERNAL MEDICINE

## 2025-06-04 PROCEDURE — G2211 COMPLEX E/M VISIT ADD ON: HCPCS | Performed by: INTERNAL MEDICINE

## 2025-06-04 PROCEDURE — 99213 OFFICE O/P EST LOW 20 MIN: CPT | Performed by: INTERNAL MEDICINE

## 2025-06-04 PROCEDURE — 1036F TOBACCO NON-USER: CPT | Performed by: INTERNAL MEDICINE

## 2025-06-04 PROCEDURE — 3078F DIAST BP <80 MM HG: CPT | Performed by: INTERNAL MEDICINE

## 2025-06-04 PROCEDURE — 3077F SYST BP >= 140 MM HG: CPT | Performed by: INTERNAL MEDICINE

## 2025-06-04 RX ORDER — METHIMAZOLE 5 MG/1
TABLET ORAL
Qty: 154 TABLET | Refills: 3 | Status: SHIPPED | OUTPATIENT
Start: 2025-06-04

## 2025-06-04 ASSESSMENT — COLUMBIA-SUICIDE SEVERITY RATING SCALE - C-SSRS
2. HAVE YOU ACTUALLY HAD ANY THOUGHTS OF KILLING YOURSELF?: NO
1. IN THE PAST MONTH, HAVE YOU WISHED YOU WERE DEAD OR WISHED YOU COULD GO TO SLEEP AND NOT WAKE UP?: NO
6. HAVE YOU EVER DONE ANYTHING, STARTED TO DO ANYTHING, OR PREPARED TO DO ANYTHING TO END YOUR LIFE?: NO

## 2025-06-04 ASSESSMENT — ENCOUNTER SYMPTOMS
CONSTIPATION: 1
TROUBLE SWALLOWING: 0
SHORTNESS OF BREATH: 0
FATIGUE: 0
VOMITING: 0
NECK PAIN: 0
NERVOUS/ANXIOUS: 0
MYALGIAS: 1
NAUSEA: 0
DIARRHEA: 1
WEAKNESS: 0
TREMORS: 0
COUGH: 1
HEADACHES: 0
ARTHRALGIAS: 1
UNEXPECTED WEIGHT CHANGE: 0
ABDOMINAL PAIN: 0
FEVER: 0
PALPITATIONS: 0

## 2025-06-04 ASSESSMENT — PATIENT HEALTH QUESTIONNAIRE - PHQ9
SUM OF ALL RESPONSES TO PHQ9 QUESTIONS 1 AND 2: 2
2. FEELING DOWN, DEPRESSED OR HOPELESS: SEVERAL DAYS
1. LITTLE INTEREST OR PLEASURE IN DOING THINGS: SEVERAL DAYS

## 2025-06-04 NOTE — PATIENT INSTRUCTIONS
RECOMMENDATIONS  Continue methimazole 5 mg alternating with 10 mg every other day.     Draw TSH, free T4 with labs before next appointment  Follow up 6 months

## 2025-06-04 NOTE — LETTER
June 4, 2025     Julia Negro, APRN-CNP  [address]  [address]  [facility] [address]    Patient: Danisha Marsh   YOB: 1940   Date of Visit: 6/4/2025       Dear Dr. Julia Negro, APRN-CNP:    Thank you for referring Danisha Marsh to me for evaluation. Below are my notes for this consultation.  If you have questions, please do not hesitate to call me. I look forward to following your patient along with you.       Sincerely,     Golden Sauceda MD      CC: No Recipients  ______________________________________________________________________________________    History Of Present Illness  Danisha Marsh is a 85 y.o. female with a 20-year history of hyperthyroidism     Hyperthyroidism diagnosed while on amiodarone for atrial fibrillation.   Amiodarone therapy continued at 200 mg/day     Methimazole 5 mg, alternating 5 mg BID every other day     Remote history of right thyroid nodule resected 2002 or 2003    Past Medical History  She has a past medical history of Allergic, Arthritis, Blood in stool (12/27/2022), Cancer (Multi) (2007), Cataract (2024), Disease of thyroid gland, Diverticulitis (12/27/2022), Eczema, Heart disease (stent 2018), Hypertension, Inflammatory bowel disease, Urinary tract infection, Varicella, and Visual impairment.    Surgical History  She has a past surgical history that includes Back surgery (1973); Cardiac catheterization; Colon surgery (2007); Coronary stent placement (2018); Cardiac surgery (ablasion & pace maker for afib); Hernia repair (2007); and Joint replacement (2019).     Social History  She reports that she quit smoking about 57 years ago. Her smoking use included cigarettes. She has a 2.5 pack-year smoking history. She has never used smokeless tobacco. She reports that she does not drink alcohol and does not use drugs.    Family History  Family History[1]    Allergies  Erythromycin base, Shellfish containing products, Shellfish derived, Augmentin  [amoxicillin-pot clavulanate], Adhesive, Adhesive tape-silicones, Carbamates, Latex, Oxycodone-acetaminophen, and Ragweed    Medications  Current Outpatient Medications   Medication Instructions   • acetaminophen (TYLENOL) 650 mg, Every 6 hours PRN   • amiodarone (Pacerone) 200 mg tablet 1 tablet, Daily (0630)   • amLODIPine (NORVASC) 5 mg, Every 12 hours scheduled (0630,1830)   • amoxicillin-clavulanate (Augmentin) 500-125 mg tablet 1 tablet, oral, 2 times daily   • azelastine-fluticasone (Dymista) 137-50 mcg/spray nasal spray 1 spray, Each Nostril, 2 times daily, Use in each nostril as directed   • Eliquis 5 mg, Every 12 hours scheduled (0630,1830)   • guaiFENesin (ROBITUSSIN) 200 mg, 3 times daily PRN   • indapamide (LOZOL) 2.5 mg, oral, Daily   • irbesartan (AVAPRO) 150 mg, oral, 2 times daily   • methIMAzole (Tapazole) 5 mg tablet 1 tab daily then 1 tab in the evening every other day   • metoprolol tartrate (LOPRESSOR) 50 mg, oral, Every 12 hours scheduled (0630,1830)   • multivitamin tablet 1 tablet, Daily   • nystatin (Mycostatin) 100,000 unit/gram powder 1 Application, Topical, 2 times daily   • nystatin (Mycostatin) cream Topical, As needed   • ondansetron ODT (ZOFRAN-ODT) 4 mg, oral, Every 8 hours PRN   • rosuvastatin (CRESTOR) 20 mg, Nightly       Review of Systems   Constitutional:  Negative for fatigue, fever and unexpected weight change.   HENT:  Negative for trouble swallowing.    Eyes:  Negative for visual disturbance.        Dry   Respiratory:  Positive for cough. Negative for shortness of breath.    Cardiovascular:  Negative for chest pain and palpitations.   Gastrointestinal:  Positive for constipation and diarrhea. Negative for abdominal pain, nausea and vomiting.   Endocrine: Negative for cold intolerance and heat intolerance.   Musculoskeletal:  Positive for arthralgias and myalgias. Negative for neck pain.   Skin:  Negative for rash.   Neurological:  Negative for tremors, weakness and  headaches.   Psychiatric/Behavioral:  The patient is not nervous/anxious.          Last Recorded Vitals  Blood pressure 144/65, pulse 54, weight 94.8 kg (209 lb).    Physical Exam  Constitutional:       General: She is not in acute distress.  HENT:      Head: Normocephalic.   Neurological:      Mental Status: She is alert.   Psychiatric:         Mood and Affect: Affect normal.          Relevant Results  Lab Results   Component Value Date    TSH 2.19 05/19/2025       IMPRESSION  HYPERTHYROIDISM  History of amiodarone induced hyperthyroidism  Stably euthyroid on low dose methimazole, with continued amiodarone  Remote history of surgery for thyroid nodules  Residual small nodules, at low risk for malignancy.       RECOMMENDATIONS  Continue methimazole 5 mg alternating with 10 mg every other day.     Draw TSH, free T4 with labs before next appointment  Follow up 6 months         [1]  Family History  Problem Relation Name Age of Onset   • Cancer Mother Nereyda Yen    • Colon cancer Mother Nereyda Yen    • Heart disease Father Juanito Yen    • Colon cancer Maternal Grandmother          [1]  Family History  Problem Relation Name Age of Onset   • Cancer Mother Nereyda Yen    • Colon cancer Mother Nereyda Yen    • Heart disease Father Juanito Yen    • Colon cancer Maternal Grandmother

## 2025-06-04 NOTE — PROGRESS NOTES
History Of Present Illness  Dainsha Marsh is a 85 y.o. female with a 20-year history of hyperthyroidism     Hyperthyroidism diagnosed while on amiodarone for atrial fibrillation.   Amiodarone therapy continued at 200 mg/day     Methimazole 5 mg, alternating 5 mg BID every other day     Remote history of right thyroid nodule resected 2002 or 2003    Past Medical History  She has a past medical history of Allergic, Arthritis, Blood in stool (12/27/2022), Cancer (Multi) (2007), Cataract (2024), Disease of thyroid gland, Diverticulitis (12/27/2022), Eczema, Heart disease (stent 2018), Hypertension, Inflammatory bowel disease, Urinary tract infection, Varicella, and Visual impairment.    Surgical History  She has a past surgical history that includes Back surgery (1973); Cardiac catheterization; Colon surgery (2007); Coronary stent placement (2018); Cardiac surgery (ablasion & pace maker for afib); Hernia repair (2007); and Joint replacement (2019).     Social History  She reports that she quit smoking about 57 years ago. Her smoking use included cigarettes. She has a 2.5 pack-year smoking history. She has never used smokeless tobacco. She reports that she does not drink alcohol and does not use drugs.    Family History  Family History[1]    Allergies  Erythromycin base, Shellfish containing products, Shellfish derived, Augmentin [amoxicillin-pot clavulanate], Adhesive, Adhesive tape-silicones, Carbamates, Latex, Oxycodone-acetaminophen, and Ragweed    Medications  Current Outpatient Medications   Medication Instructions    acetaminophen (TYLENOL) 650 mg, Every 6 hours PRN    amiodarone (Pacerone) 200 mg tablet 1 tablet, Daily (0630)    amLODIPine (NORVASC) 5 mg, Every 12 hours scheduled (0630,1830)    amoxicillin-clavulanate (Augmentin) 500-125 mg tablet 1 tablet, oral, 2 times daily    azelastine-fluticasone (Dymista) 137-50 mcg/spray nasal spray 1 spray, Each Nostril, 2 times daily, Use in each nostril as directed     Eliquis 5 mg, Every 12 hours scheduled (0630,1830)    guaiFENesin (ROBITUSSIN) 200 mg, 3 times daily PRN    indapamide (LOZOL) 2.5 mg, oral, Daily    irbesartan (AVAPRO) 150 mg, oral, 2 times daily    methIMAzole (Tapazole) 5 mg tablet 1 tab daily then 1 tab in the evening every other day    metoprolol tartrate (LOPRESSOR) 50 mg, oral, Every 12 hours scheduled (0630,1830)    multivitamin tablet 1 tablet, Daily    nystatin (Mycostatin) 100,000 unit/gram powder 1 Application, Topical, 2 times daily    nystatin (Mycostatin) cream Topical, As needed    ondansetron ODT (ZOFRAN-ODT) 4 mg, oral, Every 8 hours PRN    rosuvastatin (CRESTOR) 20 mg, Nightly       Review of Systems   Constitutional:  Negative for fatigue, fever and unexpected weight change.   HENT:  Negative for trouble swallowing.    Eyes:  Negative for visual disturbance.        Dry   Respiratory:  Positive for cough. Negative for shortness of breath.    Cardiovascular:  Negative for chest pain and palpitations.   Gastrointestinal:  Positive for constipation and diarrhea. Negative for abdominal pain, nausea and vomiting.   Endocrine: Negative for cold intolerance and heat intolerance.   Musculoskeletal:  Positive for arthralgias and myalgias. Negative for neck pain.   Skin:  Negative for rash.   Neurological:  Negative for tremors, weakness and headaches.   Psychiatric/Behavioral:  The patient is not nervous/anxious.          Last Recorded Vitals  Blood pressure 144/65, pulse 54, weight 94.8 kg (209 lb).    Physical Exam  Constitutional:       General: She is not in acute distress.  HENT:      Head: Normocephalic.   Neurological:      Mental Status: She is alert.   Psychiatric:         Mood and Affect: Affect normal.          Relevant Results  Lab Results   Component Value Date    TSH 2.19 05/19/2025       IMPRESSION  HYPERTHYROIDISM  History of amiodarone induced hyperthyroidism  Stably euthyroid on low dose methimazole, with continued amiodarone  Remote  history of surgery for thyroid nodules  Residual small nodules, at low risk for malignancy.       RECOMMENDATIONS  Continue methimazole 5 mg alternating with 10 mg every other day.     Draw TSH, free T4 with labs before next appointment  Follow up 6 months         [1]   Family History  Problem Relation Name Age of Onset    Cancer Mother Nereyda Yovana     Colon cancer Mother Nereyda Yen     Heart disease Father Juanito Yovana     Colon cancer Maternal Grandmother

## 2025-06-10 DIAGNOSIS — I10 BENIGN ESSENTIAL HYPERTENSION: ICD-10-CM

## 2025-06-10 RX ORDER — METOPROLOL TARTRATE 50 MG/1
50 TABLET ORAL
Qty: 180 TABLET | Refills: 1 | Status: SHIPPED | OUTPATIENT
Start: 2025-06-10

## 2025-06-20 ENCOUNTER — HOSPITAL ENCOUNTER (OUTPATIENT)
Dept: RADIOLOGY | Facility: CLINIC | Age: 85
Discharge: HOME | End: 2025-06-20
Payer: MEDICARE

## 2025-06-20 ENCOUNTER — TELEPHONE (OUTPATIENT)
Dept: PRIMARY CARE | Facility: CLINIC | Age: 85
End: 2025-06-20

## 2025-06-20 ENCOUNTER — OFFICE VISIT (OUTPATIENT)
Dept: PRIMARY CARE | Facility: CLINIC | Age: 85
End: 2025-06-20
Payer: MEDICARE

## 2025-06-20 VITALS
BODY MASS INDEX: 35.39 KG/M2 | TEMPERATURE: 97.9 F | HEIGHT: 65 IN | WEIGHT: 212.4 LBS | OXYGEN SATURATION: 97 % | SYSTOLIC BLOOD PRESSURE: 172 MMHG | DIASTOLIC BLOOD PRESSURE: 74 MMHG | HEART RATE: 65 BPM

## 2025-06-20 DIAGNOSIS — J20.8 ACUTE BACTERIAL BRONCHITIS: ICD-10-CM

## 2025-06-20 DIAGNOSIS — R50.9 FEVER IN ADULT: ICD-10-CM

## 2025-06-20 DIAGNOSIS — I10 BENIGN ESSENTIAL HYPERTENSION: ICD-10-CM

## 2025-06-20 DIAGNOSIS — R50.9 FEVER IN ADULT: Primary | ICD-10-CM

## 2025-06-20 DIAGNOSIS — Z00.00 ROUTINE ADULT HEALTH MAINTENANCE: ICD-10-CM

## 2025-06-20 DIAGNOSIS — B96.89 ACUTE BACTERIAL BRONCHITIS: ICD-10-CM

## 2025-06-20 LAB
POC RAPID INFLUENZA A: NEGATIVE
POC RAPID INFLUENZA B: NEGATIVE
POC SARS-COV-2 AG BINAX: NORMAL

## 2025-06-20 PROCEDURE — 71046 X-RAY EXAM CHEST 2 VIEWS: CPT

## 2025-06-20 PROCEDURE — 3078F DIAST BP <80 MM HG: CPT | Performed by: NURSE PRACTITIONER

## 2025-06-20 PROCEDURE — 1159F MED LIST DOCD IN RCRD: CPT | Performed by: NURSE PRACTITIONER

## 2025-06-20 PROCEDURE — 1160F RVW MEDS BY RX/DR IN RCRD: CPT | Performed by: NURSE PRACTITIONER

## 2025-06-20 PROCEDURE — 71046 X-RAY EXAM CHEST 2 VIEWS: CPT | Performed by: RADIOLOGY

## 2025-06-20 PROCEDURE — 99213 OFFICE O/P EST LOW 20 MIN: CPT | Performed by: NURSE PRACTITIONER

## 2025-06-20 PROCEDURE — 1036F TOBACCO NON-USER: CPT | Performed by: NURSE PRACTITIONER

## 2025-06-20 PROCEDURE — 87811 SARS-COV-2 COVID19 W/OPTIC: CPT | Performed by: NURSE PRACTITIONER

## 2025-06-20 PROCEDURE — 87804 INFLUENZA ASSAY W/OPTIC: CPT | Performed by: NURSE PRACTITIONER

## 2025-06-20 PROCEDURE — G2211 COMPLEX E/M VISIT ADD ON: HCPCS | Performed by: NURSE PRACTITIONER

## 2025-06-20 PROCEDURE — 3077F SYST BP >= 140 MM HG: CPT | Performed by: NURSE PRACTITIONER

## 2025-06-20 RX ORDER — DOXYCYCLINE 100 MG/1
100 CAPSULE ORAL 2 TIMES DAILY
Qty: 14 CAPSULE | Refills: 0 | Status: SHIPPED | OUTPATIENT
Start: 2025-06-20 | End: 2025-06-20 | Stop reason: WASHOUT

## 2025-06-20 ASSESSMENT — PATIENT HEALTH QUESTIONNAIRE - PHQ9
2. FEELING DOWN, DEPRESSED OR HOPELESS: NOT AT ALL
1. LITTLE INTEREST OR PLEASURE IN DOING THINGS: NOT AT ALL
SUM OF ALL RESPONSES TO PHQ9 QUESTIONS 1 AND 2: 0

## 2025-06-20 ASSESSMENT — ENCOUNTER SYMPTOMS
DIARRHEA: 0
ACTIVITY CHANGE: 1
COLOR CHANGE: 0
HEADACHES: 1
SHORTNESS OF BREATH: 0
WHEEZING: 0
SORE THROAT: 1
WOUND: 0
NAUSEA: 0
FATIGUE: 1
SLEEP DISTURBANCE: 0
COUGH: 1
APPETITE CHANGE: 0
TROUBLE SWALLOWING: 0
DIFFICULTY URINATING: 0
FEVER: 1
FEVER: 0
VOMITING: 0
HEADACHES: 0
MYALGIAS: 1
RHINORRHEA: 0
CHILLS: 1
MYALGIAS: 0

## 2025-06-20 NOTE — TELEPHONE ENCOUNTER
LENARD Fay  [facility] Clinical Support Staff  Caller: Unspecified (Today,  4:04 PM)         06/20/2025 - Patient Call: Elsie Ibarra and others  (Newest Message First)             View All Conversations on this Encounter    6/20/25  4:29 PM  LENARD Fay routed this conversation to [facility] [place] (Selected Message)  LENARD Fay      6/20/25  4:29 PM  Note     As long as patient's symptoms are stable/continue to improve, we can avoid oral antibiotics due to multiple allergy symptoms.  If things begin to worsen, fevers persist, please have her notify the office and we can discuss an additional/different antibiotic        6/20/25  4:08 PM  Jasmin Noonan MA routed this conversation to LENARD Fay       6/20/25  4:04 PM  Elsie Ibarra routed this conversation to [facility] Clinical Support Staff      6/20/25  4:04 PM  Danisha Marsh contacted Elsie Ibarra  AC    6/20/25  4:04 PM  Note     PT states that her Chest Xray looked fine. PT is wondering is she needs to take medication.     PT also states that she cannot take doxycyline due to a bad history with the drug. PT is seeking alternative if she needs to be on a medication        Spoke with pt. CA

## 2025-06-20 NOTE — TELEPHONE ENCOUNTER
As long as patient's symptoms are stable/continue to improve, we can avoid oral antibiotics due to multiple allergy symptoms.  If things begin to worsen, fevers persist, please have her notify the office and we can discuss an additional/different antibiotic

## 2025-06-20 NOTE — ASSESSMENT & PLAN NOTE
Patient instructed to take an additional dose of metoprolol when she arrives home from appointment.  She is to continue to monitor her blood pressure at home and notify provider for any persistent issues with hyper or hypotension.  She is also to notify provider for any new cardiac concerns.  Patient to maintain routine follow-up with cardiology.  Clinical pharmacy order placed for additional recommendations regarding hypertension as she is already on multiple medications and has some bilateral lower extremity edema secondary to amlodipine

## 2025-06-20 NOTE — TELEPHONE ENCOUNTER
PT states that her Chest Xray looked fine. PT is wondering is she needs to take medication.    PT also states that she cannot take doxycyline due to a bad history with the drug. PT is seeking alternative if she needs to be on a medication

## 2025-06-20 NOTE — PATIENT INSTRUCTIONS
Upper respiratory infections (URIs), also known as colds, are usually caused by viruses and can be treated at home:   -Drink fluids: Drinking lots of water, diluted juice, or tea can help soothe a sore throat and keep you hydrated.   -Take pain relievers: Acetaminophen (Tylenol) or ibuprofen (Advil, Motrin) can help with fever, headache, or pain.   -Use a humidifier: A cool mist vaporizer or humidifier can help relieve congestion.   -Gargle with salt water: Gargling with salt water can help with a sore throat.   -Take throat lozenges or sprays: These can help increase saliva production and soothe a sore throat.   -Get enough rest: Getting enough sleep can help your body fight the virus.   -Most URIs go away on their own within one to two weeks. You should contact your healthcare provider if your symptoms last longer than two weeks.       You are found to have an elevated blood pressure today, please take an extra dose of your metoprolol when you get home.  Continue to monitor your blood pressure routinely and notify the office if elevations persist.  I am placing a referral for pharmacy to discuss improved blood pressure management/leg edema  Monitor your blood pressure at least once daily. Keep a log for provider review. Call the office in one week with your readings. Please call the office before than if the systolic blood pressure (top number) is consistently greater than 140 or the diastolic blood pressure (bottom number) is consistently greater than 90.  Call 911 or emergency medical services if your blood pressure is 180/120 mm Hg or greater and you have chest pain, shortness of breath, or symptoms of stroke. Stroke symptoms include numbness or tingling, trouble speaking, or changes in vision

## 2025-06-20 NOTE — ASSESSMENT & PLAN NOTE
Based upon fever and adventitious lung sounds we will order chest x-ray for additional assessment purposes.  In office COVID and flu testing were negative.  Patient encouraged to rest, stay hydrated and notify provider for any persistent/worsening respiratory/infection concerns   27-Oct-2020 11:13

## 2025-06-20 NOTE — PROGRESS NOTES
Subjective   Patient ID: Danisha Marsh is a 85 y.o. female who presents for Fever (Pt states started 6/19), Sore Throat, Headache, and Cough.    Patient seen today due to acute respiratory concerns and a fever.  Patient states she has been having a progressive cough and sore throat for the past several days but then developed a fever yesterday around 100 °F.  She does report some increased fatigue but is doing her best to eat and stay hydrated.  No reported issues with nausea, vomiting or diarrhea.  She does report dull headache and generalized muscle aches.  Patient states that she has been utilizing allergy medications with minimal improvement of symptoms.  No known sick contacts reported.  Discussed patient's elevated blood pressure reading today.  Patient states he was in asymptomatic and denies any issues with chest pain, pressure, blurred vision or other major cardiac concerns.  She reports that her blood pressure readings go up and down at home.  Patient does follow with cardiology but was apparently told to follow-up with primary care for tighter control.  She states that after pacemaker was placed in the beginning of the year amlodipine was reinitiated which caused her bilateral lower extremity edema.  Medications reviewed.  No other acute concerns at this time.    Fever   Associated symptoms include coughing, headaches and a sore throat. Pertinent negatives include no chest pain, congestion, diarrhea, nausea, rash, vomiting or wheezing.   Sore Throat   Associated symptoms include coughing and headaches. Pertinent negatives include no congestion, diarrhea, shortness of breath, trouble swallowing or vomiting.   Headache   Associated symptoms include coughing, a fever and a sore throat. Pertinent negatives include no nausea, rhinorrhea or vomiting.   Cough  The current episode started in the past 7 days. The problem has been gradually worsening. The problem occurs hourly. The cough is Productive of  sputum. Associated symptoms include chills, a fever, headaches, myalgias, postnasal drip and a sore throat. Pertinent negatives include no chest pain, ear congestion, rash, rhinorrhea, shortness of breath or wheezing. The symptoms are aggravated by lying down.     Current Outpatient Medications on File Prior to Visit   Medication Sig Dispense Refill    acetaminophen (Tylenol) 325 mg tablet Take 2 tablets (650 mg) by mouth every 6 hours if needed for moderate pain (4 - 6).      amiodarone (Pacerone) 200 mg tablet Take 1 tablet (200 mg) by mouth early in the morning..      amLODIPine (Norvasc) 5 mg tablet Take 1 tablet (5 mg) by mouth every 12 hours.      azelastine-fluticasone (Dymista) 137-50 mcg/spray nasal spray Administer 1 spray into each nostril 2 times a day. Use in each nostril as directed 1 each 0    Eliquis 5 mg tablet Take 1 tablet (5 mg) by mouth every 12 hours.      guaiFENesin (Robitussin) 100 mg/5 mL syrup Take 10 mL (200 mg) by mouth 3 times a day as needed for cough.      indapamide (Lozol) 2.5 mg tablet Take 1 tablet (2.5 mg) by mouth once daily. 90 tablet 3    irbesartan (Avapro) 300 mg tablet Take 0.5 tablets (150 mg) by mouth 2 times a day. 90 tablet 3    methIMAzole (Tapazole) 5 mg tablet 1 tab daily then 1 tab in the evening every other day 154 tablet 3    metoprolol tartrate (Lopressor) 50 mg tablet Take 1 tablet by mouth every 12 hours. 180 tablet 1    multivitamin tablet Take 1 tablet by mouth once daily.      nystatin (Mycostatin) cream Apply topically if needed (Applied to affected area twice daily as needed). 60 g 2    rosuvastatin (Crestor) 20 mg tablet Take 1 tablet (20 mg) by mouth once daily at bedtime.      [] nystatin (Mycostatin) 100,000 unit/gram powder Apply 1 Application topically 2 times a day. 60 g 2     No current facility-administered medications on file prior to visit.       Past Medical History:   Diagnosis Date    Allergic     Arthritis     Blood in stool  "12/27/2022    Cancer (Multi) 2007    Cataract 2024    Disease of thyroid gland     Diverticulitis 12/27/2022    Eczema     Heart disease stent 2018    Hypertension     Inflammatory bowel disease     Urinary tract infection     Varicella     Visual impairment         Past Surgical History:   Procedure Laterality Date    BACK SURGERY  1973    CARDIAC CATHETERIZATION      CARDIAC SURGERY  ablasion & pace maker for afib    COLON SURGERY  2007    CORONARY STENT PLACEMENT  2018    HERNIA REPAIR  2007    JOINT REPLACEMENT  2019        Family History   Problem Relation Name Age of Onset    Cancer Mother Nereyda Yen     Colon cancer Mother Nereyda Yen     Heart disease Father Juanito Yen     Colon cancer Maternal Grandmother          Review of Systems   Constitutional:  Positive for activity change, chills, fatigue and fever. Negative for appetite change.   HENT:  Positive for postnasal drip and sore throat. Negative for congestion, dental problem, rhinorrhea and trouble swallowing.         Reports chronic postnasal drip   Eyes:  Negative for visual disturbance.        Wears glasses.  Underwent bilateral cataract surgery   Respiratory:  Positive for cough. Negative for shortness of breath and wheezing.         See HPI   Cardiovascular:  Negative for chest pain.   Gastrointestinal:  Negative for diarrhea, nausea and vomiting.        Positive for colon cancer history.     Genitourinary:  Negative for difficulty urinating.        See HPI   Musculoskeletal:  Positive for myalgias.        Positive for chronic bilateral hip and lower back pain.    Skin:  Negative for color change, pallor, rash and wound.        Positive skin cancer history.   Neurological:  Positive for headaches.   Psychiatric/Behavioral:  Negative for sleep disturbance.    All other systems reviewed and are negative.      Objective   /74   Pulse 65   Temp 36.6 °C (97.9 °F) (Oral)   Ht 1.651 m (5' 5\")   Wt 96.3 kg (212 lb 6.4 oz)   SpO2 97%   BMI " 35.35 kg/m²     Physical Exam  Constitutional:       General: She is not in acute distress.     Appearance: Normal appearance. She is not toxic-appearing.   HENT:      Head: Normocephalic and atraumatic.      Right Ear: Ear canal and external ear normal. There is impacted cerumen.      Left Ear: Hearing, tympanic membrane, ear canal and external ear normal.      Nose: Nose normal.      Mouth/Throat:      Mouth: Mucous membranes are moist.      Pharynx: Oropharynx is clear. No oropharyngeal exudate or posterior oropharyngeal erythema.      Comments: Missing just a few teeth.  Overall fair dentition  Eyes:      Extraocular Movements: Extraocular movements intact.      Conjunctiva/sclera: Conjunctivae normal.   Cardiovascular:      Rate and Rhythm: Normal rate and regular rhythm.      Heart sounds: No murmur heard.  Pulmonary:      Effort: Pulmonary effort is normal. No respiratory distress.      Breath sounds: Rales present. No wheezing.      Comments: Faint crackle to LLL  Abdominal:      General: Bowel sounds are normal.      Palpations: Abdomen is soft.   Musculoskeletal:         General: No swelling.      Cervical back: Normal range of motion and neck supple.      Comments: Patient ambulates with a cane   Skin:     General: Skin is warm and dry.   Neurological:      General: No focal deficit present.      Mental Status: She is alert and oriented to person, place, and time. Mental status is at baseline.      Cranial Nerves: No cranial nerve deficit.      Motor: No weakness.   Psychiatric:         Mood and Affect: Mood normal.         Behavior: Behavior normal.         Thought Content: Thought content normal.         Judgment: Judgment normal.         Assessment/Plan   Problem List Items Addressed This Visit           ICD-10-CM    Benign essential hypertension I10    Patient instructed to take an additional dose of metoprolol when she arrives home from appointment.  She is to continue to monitor her blood pressure  at home and notify provider for any persistent issues with hyper or hypotension.  She is also to notify provider for any new cardiac concerns.  Patient to maintain routine follow-up with cardiology.  Clinical pharmacy order placed for additional recommendations regarding hypertension as she is already on multiple medications and has some bilateral lower extremity edema secondary to amlodipine         Relevant Orders    Referral to Clinical Pharmacy    Routine adult health maintenance Z00.00    Most recent blood work reviewed.  Will continue to monitor as appropriate  -Patient follows routinely with gastroenterology for colonoscopies given history of colon cancer.           Fever in adult - Primary R50.9    Based upon fever and adventitious lung sounds we will order chest x-ray for additional assessment purposes.  In office COVID and flu testing were negative.  Patient encouraged to rest, stay hydrated and notify provider for any persistent/worsening respiratory/infection concerns         Relevant Orders    XR chest 2 views

## 2025-07-09 ENCOUNTER — APPOINTMENT (OUTPATIENT)
Dept: PHARMACY | Facility: HOSPITAL | Age: 85
End: 2025-07-09
Payer: MEDICARE

## 2025-07-09 DIAGNOSIS — I10 BENIGN ESSENTIAL HYPERTENSION: ICD-10-CM

## 2025-07-09 NOTE — Clinical Note
No changes, but would greatly prefer for cardiology to perform cardiac medication changes, due to history of a stent and due to history of a pacemaker. If no changes are made, will follow-up with patient to determine next most appropriate steps.

## 2025-07-09 NOTE — PROGRESS NOTES
"Outpatient Clinical Pharmacy Visit  Danisha Marsh is a 85 y.o. female who was referred to the ambulatory Clinical Pharmacy Team for her Hypertension.    Referring Provider: Julia Negro, KARLA-CNP   Next follow-up: TBD    Allergies  Allergies[1]     Preferred Pharmacy  [id number] - [place], [place] - [address] [address]  Phone: [phone] Fax: [phone]    Hypertension Assessment  Today, Danisha presents due to a history of hypertension. Per Danisha, she has previously been implanted with a pacemaker. However, she notes that the \"pacemaker is not pacing,\" but rather, just \"monitoring\" (history of atrial fibrillation). This pacemaker was placed in January 2025, and she believes that she has had difficulty controlling her blood pressure ever since--primarily believing that her blood pressure is \"too low now.\" She can frequently feel \"out of it,\" too tired, and not herself. She also notes previous heart rates (assorted), at 47, 48, and 50 bpm. She recalls being instructed in January 2025 to take Norvasc as PRN.     Danisha currently follows with two different cardiologists--one for the pacemaker specifically, and with another monitoring her stent.     She also recalls a past history of chronically low sodium and low potassium.     Current HTN Pharmacotherapy  Note: Patient's race (impacts medication therapy): white  Amlodipine 5 mg BID- patient self discontinued except PRN  Indapamide 2.5 mg daily- has been >20 years, started by PCP years ago  Irbesartan 300 mg, 0.5 tab BID  Metoprolol tartrate 50 mg BID    Historic HTN Pharmacotherapy  Amlodipine     Lifestyle  Diet: Does a lot of home-cooking, rarely eats out  B: Fruit juice cereal coffee  L: Cottage cheese, fruit, half-sandwich, tuna, egg-salad, tries to avoid eating lunch-meat  D: Protein (mostly chicken), vegetable, potato, sometimes home-made soup, rice  Exercise: \"I wish I had a better pattern.\" She is restricted " significantly by joint pain.   Sodium intake: Denies any salt restrictions or supplementation  Smoking: Former    Assessment of Risk Factors  Cardiovascular risk factors: advanced age (older than 55 for men, 65 for women), hypertension, obesity (BMI >= 30 kg/m2), sedentary lifestyle, and smoking/ tobacco exposure  Use of agents associated with hypertension: none  History of target organ damage: prior coronary revascularization (stent placement).   The ASCVD Risk score (Kelechi DK, et al., 2019) failed to calculate for the following reasons:    The 2019 ASCVD risk score is only valid for ages 40 to 79     Home Blood Pressure Monitoring  Patient has a blood pressure cuff at home? Yes    Date Systolic (mmHg) Diastolic  (mmHg) Heart Rate  (bpm)  Notes   7/9/25 119 70 N/A    7/9/25 96 57 N/A      Assessment of Symptoms  Has the patient experienced any low blood pressures since last contact? no  denies symptoms of low blood pressure: lightheadedness, dizziness, falls, blurry vision, clammy/pale skin   Has the patient experienced any high blood pressures since last contact? no    Patient Goals  Your blood pressure goal is less than 120/80  Eat right: Your diet should be rich in fruits, vegetables, potassium, and low-fat dairy products. You should also reduce your intake of sodium and fats, particularly saturated fats.  Maintain a healthy weight: Try to achieve and maintain a healthy weight. If you are unsure what this means for you, please contact our clinic.  Exercise: Try to get at least 30 minutes of aerobic exercise every day.  Moderate your alcohol consumption: Limit your alcohol intake to one drink per day.  Monitor your blood pressure: You should check your blood pressure regularly. Notify our clinic if your blood pressure readings are consistently higher than recommended.    Laboratory Results  Lab Results   Component Value Date    BILITOT 0.7 05/29/2025    CALCIUM 8.8 05/29/2025    CO2 25 05/29/2025    CL 91 (L)  "05/29/2025    CREATININE 0.68 05/29/2025    GLUCOSE 105 (H) 05/29/2025    ALKPHOS 91 05/29/2025    K 4.2 05/29/2025    PROT 6.7 05/29/2025     (L) 05/29/2025    AST 33 05/29/2025    ALT 25 05/29/2025    BUN 14 05/29/2025    ANIONGAP 15 05/29/2025    MG 1.71 05/29/2025    ALBUMIN 3.7 05/29/2025    LIPASE 18 05/29/2025    EGFR 87 05/29/2025     No results found for: \"TRIG\", \"CHOL\", \"LDL\", \"HDL\"  Lab Results   Component Value Date    HGBA1C 5.7 (H) 06/14/2024       Assessment/Plan   Problem List Items Addressed This Visit       Benign essential hypertension    Relevant Orders    Referral to Clinical Pharmacy     Assessment  It is clear that Danisha needs further evaluation of her antihypertensive medications. Due to her stent history and pacemaker history, will defer to cardiology at this time for recommendations/changes. Patient will be following up with cardiology on 7/24/25. If no changes are made at this visit, will determine, alongside patient, the next most appropriate steps for her care.     Plan/Changes   Continue all meds under the continuation of care with the referring provider and clinical pharmacy team    Next Clinical Pharmacy Follow-Up  8/6/2025  Cardiology follow-up      Digna Blair, Elsa     Verbal consent to manage patient's drug therapy was obtained from the patient. They were informed they may decline to participate or withdraw from participation in pharmacy services at any time.         [1]   Allergies  Allergen Reactions    Erythromycin Base Other     Pt is on amiodarone. Contraindicated.    pt is on Amiodarone and can't take that with Erythromycin.    Shellfish Containing Products Shortness of breath     **CAN/HAS HAD CT w/ Contrast per pt**  SOB is intermittent per pt.     Shellfish Derived Shortness of breath    Augmentin [Amoxicillin-Pot Clavulanate] Nausea/vomiting    Adhesive Other     REDNESS OF SKIN    Adhesive Tape-Silicones Rash    Carbamates Palpitations and Other     " skelaxin-palpitations    Doxycycline Other     Patient reports intolerance.    Latex Rash    Oxycodone-Acetaminophen Nausea/vomiting    Ragweed Itching     rhinitis

## 2025-07-15 ENCOUNTER — OFFICE VISIT (OUTPATIENT)
Dept: PRIMARY CARE | Facility: CLINIC | Age: 85
End: 2025-07-15
Payer: MEDICARE

## 2025-07-15 VITALS
BODY MASS INDEX: 34.66 KG/M2 | HEIGHT: 65 IN | SYSTOLIC BLOOD PRESSURE: 144 MMHG | DIASTOLIC BLOOD PRESSURE: 84 MMHG | OXYGEN SATURATION: 98 % | HEART RATE: 57 BPM | WEIGHT: 208 LBS

## 2025-07-15 DIAGNOSIS — N30.00 ACUTE CYSTITIS WITHOUT HEMATURIA: Primary | ICD-10-CM

## 2025-07-15 DIAGNOSIS — R30.0 BURNING WITH URINATION: ICD-10-CM

## 2025-07-15 DIAGNOSIS — R09.82 POST-NASAL DRIP: ICD-10-CM

## 2025-07-15 DIAGNOSIS — K57.92 DIVERTICULITIS: ICD-10-CM

## 2025-07-15 LAB
POC APPEARANCE, URINE: CLEAR
POC BILIRUBIN, URINE: NEGATIVE
POC BLOOD, URINE: ABNORMAL
POC COLOR, URINE: YELLOW
POC GLUCOSE, URINE: NEGATIVE MG/DL
POC KETONES, URINE: NEGATIVE MG/DL
POC LEUKOCYTES, URINE: ABNORMAL
POC NITRITE,URINE: POSITIVE
POC PH, URINE: 6.5 PH
POC PROTEIN, URINE: NEGATIVE MG/DL
POC SPECIFIC GRAVITY, URINE: 1.01
POC UROBILINOGEN, URINE: 0.2 EU/DL

## 2025-07-15 PROCEDURE — 1160F RVW MEDS BY RX/DR IN RCRD: CPT | Performed by: NURSE PRACTITIONER

## 2025-07-15 PROCEDURE — 81002 URINALYSIS NONAUTO W/O SCOPE: CPT | Performed by: NURSE PRACTITIONER

## 2025-07-15 PROCEDURE — 3079F DIAST BP 80-89 MM HG: CPT | Performed by: NURSE PRACTITIONER

## 2025-07-15 PROCEDURE — 1159F MED LIST DOCD IN RCRD: CPT | Performed by: NURSE PRACTITIONER

## 2025-07-15 PROCEDURE — 3077F SYST BP >= 140 MM HG: CPT | Performed by: NURSE PRACTITIONER

## 2025-07-15 PROCEDURE — 99213 OFFICE O/P EST LOW 20 MIN: CPT | Performed by: NURSE PRACTITIONER

## 2025-07-15 PROCEDURE — 1036F TOBACCO NON-USER: CPT | Performed by: NURSE PRACTITIONER

## 2025-07-15 RX ORDER — AMOXICILLIN AND CLAVULANATE POTASSIUM 875; 125 MG/1; MG/1
875 TABLET, FILM COATED ORAL 2 TIMES DAILY
Qty: 14 TABLET | Refills: 0 | Status: SHIPPED | OUTPATIENT
Start: 2025-07-15 | End: 2025-07-15 | Stop reason: ALTCHOICE

## 2025-07-15 RX ORDER — AMOXICILLIN AND CLAVULANATE POTASSIUM 500; 125 MG/1; MG/1
500 TABLET, FILM COATED ORAL 2 TIMES DAILY
Qty: 20 TABLET | Refills: 0 | Status: SHIPPED | OUTPATIENT
Start: 2025-07-15 | End: 2025-07-25

## 2025-07-15 RX ORDER — AZELASTINE HYDROCHLORIDE, FLUTICASONE PROPIONATE 137; 50 UG/1; UG/1
1 SPRAY, METERED NASAL 2 TIMES DAILY
Qty: 1 EACH | Refills: 2 | Status: SHIPPED | OUTPATIENT
Start: 2025-07-15

## 2025-07-15 ASSESSMENT — ENCOUNTER SYMPTOMS
DYSURIA: 1
FREQUENCY: 1
NAUSEA: 0
VOMITING: 0
BACK PAIN: 1
DIZZINESS: 0
ABDOMINAL PAIN: 0
APPETITE CHANGE: 0
FEVER: 0
WHEEZING: 0
BRUISES/BLEEDS EASILY: 0
DIARRHEA: 1
COLOR CHANGE: 0
SLEEP DISTURBANCE: 0
COUGH: 0
LIGHT-HEADEDNESS: 0
FLANK PAIN: 1
WEAKNESS: 0
SHORTNESS OF BREATH: 0
ARTHRALGIAS: 1
JOINT SWELLING: 0
WOUND: 0
FATIGUE: 0
MYALGIAS: 0
TROUBLE SWALLOWING: 0
PALPITATIONS: 0
ADENOPATHY: 0
CHILLS: 0

## 2025-07-15 NOTE — PATIENT INSTRUCTIONS
-Based upon your symptoms and in office testing, it appears that you have a urinary tract infection. A urinary tract infection (UTI) is an infection that occurs in the urinary tract, which includes the bladder, urethra, ureters, and kidneys. They are often caused by bacteria, and women are more likely to experience them than men due to their shorter urethras.   -It is recommended that you crease your fluid intake, start a course of oral antibiotics in addition to utilizing over-the-counter medications such as ibuprofen or acetaminophen for additional symptom relief.    -We will send an additional urine sample for culture to confirm the infection and notify when results come back usually within 2-3 days.  -Please notify the office for any persistent/worsening urinary/infection concerns including fever, chills, back pain, nausea, vomiting or diarrhea.

## 2025-07-15 NOTE — PROGRESS NOTES
Subjective   Patient ID: Danisha Marsh is a 85 y.o. female who presents for UTI.    Patient seen today due to acute urinary and bowel concerns.  For the past 3 days she reports increased burning with urination as well as increased frequency and intermittent flank pain.  Patient denies any fever, chills or other systemic concerns.  She also reports some loose stools for the past several days and is voicing concerns that this may be attributed to another diverticulitis flareup.  She reports having a fissure that gets irritated at times and is not sure if she has a true urine infection or if it is secondary to her bowel issues.  Follow-up in place in the near future with gastroenterologist.  Patient reports that she is doing her best to stay hydrated as noted issues with increased fatigue or poor appetite. Medications reviewed.  No other acute concerns voiced at this time.      UTI   Associated symptoms include flank pain and frequency. Pertinent negatives include no chills, nausea or vomiting.     Current Outpatient Medications on File Prior to Visit   Medication Sig Dispense Refill    acetaminophen (Tylenol) 325 mg tablet Take 2 tablets (650 mg) by mouth every 6 hours if needed for moderate pain (4 - 6).      amiodarone (Pacerone) 200 mg tablet Take 1 tablet (200 mg) by mouth early in the morning..      amLODIPine (Norvasc) 5 mg tablet Take 1 tablet (5 mg) by mouth every 12 hours. (Patient taking differently: Take 1 tablet (5 mg) by mouth as needed at bedtime.)      Eliquis 5 mg tablet Take 1 tablet (5 mg) by mouth every 12 hours.      guaiFENesin (Robitussin) 100 mg/5 mL syrup Take 10 mL (200 mg) by mouth 3 times a day as needed for cough.      indapamide (Lozol) 2.5 mg tablet Take 1 tablet (2.5 mg) by mouth once daily. (Patient taking differently: Take 1 tablet (2.5 mg) by mouth once daily. Every other day) 90 tablet 3    irbesartan (Avapro) 300 mg tablet Take 0.5 tablets (150 mg) by mouth 2 times a day. 90  tablet 3    methIMAzole (Tapazole) 5 mg tablet 1 tab daily then 1 tab in the evening every other day 154 tablet 3    metoprolol tartrate (Lopressor) 50 mg tablet Take 1 tablet by mouth every 12 hours. 180 tablet 1    multivitamin tablet Take 1 tablet by mouth once daily.      nystatin (Mycostatin) cream Apply topically if needed (Applied to affected area twice daily as needed). 60 g 2    rosuvastatin (Crestor) 20 mg tablet Take 1 tablet (20 mg) by mouth once daily at bedtime.      [DISCONTINUED] azelastine-fluticasone (Dymista) 137-50 mcg/spray nasal spray Administer 1 spray into each nostril 2 times a day. Use in each nostril as directed 1 each 0     No current facility-administered medications on file prior to visit.       Past Medical History:   Diagnosis Date    Allergic     Arthritis     Blood in stool 12/27/2022    Cancer (Multi) 2007    Cataract 2024    Disease of thyroid gland 2004    Diverticulitis 12/27/2022    Eczema     Heart disease stent 2018    Hypertension     Inflammatory bowel disease     Urinary tract infection     Varicella     Visual impairment         Past Surgical History:   Procedure Laterality Date    BACK SURGERY  1973    CARDIAC CATHETERIZATION  2003    CARDIAC SURGERY  ablasion & pace maker for afib    COLON SURGERY  2007    CORONARY STENT PLACEMENT  2018    HERNIA REPAIR  2007    JOINT REPLACEMENT  2019        Family History   Problem Relation Name Age of Onset    Cancer Mother Nereyda Yen     Colon cancer Mother Nereyda Yen     Heart disease Father Juanito Yen     Colon cancer Maternal Grandmother          Review of Systems   Constitutional:  Negative for appetite change, chills, fatigue and fever.   HENT:  Positive for postnasal drip. Negative for congestion, dental problem and trouble swallowing.         Reports chronic postnasal drip   Eyes:  Negative for visual disturbance.        Wears glasses.  Underwent bilateral cataract surgery   Respiratory:  Negative for cough, shortness of  "breath and wheezing.    Cardiovascular:  Negative for chest pain, palpitations and leg swelling.   Gastrointestinal:  Positive for diarrhea. Negative for abdominal pain, nausea and vomiting.        Positive for colon cancer history.     Genitourinary:  Positive for dysuria, flank pain, frequency and pelvic pain.        See HPI   Musculoskeletal:  Positive for arthralgias and back pain. Negative for gait problem, joint swelling and myalgias.        Positive for chronic bilateral hip and lower back pain.    Skin:  Negative for color change, pallor, rash and wound.        Positive skin cancer history.   Neurological:  Negative for dizziness, weakness and light-headedness.   Hematological:  Negative for adenopathy. Does not bruise/bleed easily.   Psychiatric/Behavioral:  Negative for sleep disturbance.    All other systems reviewed and are negative.      Objective   /84   Pulse 57   Ht 1.651 m (5' 5\")   Wt 94.3 kg (208 lb)   SpO2 98%   BMI 34.61 kg/m²     Physical Exam  Constitutional:       General: She is not in acute distress.     Appearance: Normal appearance. She is not toxic-appearing.   HENT:      Head: Normocephalic and atraumatic.      Right Ear: External ear normal.      Left Ear: External ear normal.      Nose: Nose normal.      Mouth/Throat:      Mouth: Mucous membranes are moist.      Pharynx: Oropharynx is clear.      Comments: Missing just a few teeth.  Overall fair dentition  Eyes:      Extraocular Movements: Extraocular movements intact.      Conjunctiva/sclera: Conjunctivae normal.   Cardiovascular:      Rate and Rhythm: Normal rate and regular rhythm.      Pulses: Normal pulses.      Heart sounds: Murmur heard.   Pulmonary:      Effort: Pulmonary effort is normal.   Musculoskeletal:         General: No swelling.      Cervical back: Normal range of motion and neck supple.      Comments: Patient ambulates with a cane   Skin:     General: Skin is warm and dry.   Neurological:      General: No " focal deficit present.      Mental Status: She is alert and oriented to person, place, and time. Mental status is at baseline.      Cranial Nerves: No cranial nerve deficit.      Motor: No weakness.   Psychiatric:         Mood and Affect: Mood normal.         Behavior: Behavior normal.         Thought Content: Thought content normal.         Judgment: Judgment normal.         Assessment/Plan   Problem List Items Addressed This Visit           ICD-10-CM    Post-nasal drip R09.82    Relevant Medications    azelastine-fluticasone (Dymista) 137-50 mcg/spray nasal spray    Diverticulitis K57.92    Relevant Medications    amoxicillin-clavulanate (Augmentin) 500-125 mg tablet    Burning with urination R30.0    Relevant Orders    POCT UA (nonautomated) manually resulted (Completed)    Urine Culture     Other Visit Diagnoses         Codes      Acute cystitis without hematuria    -  Primary N30.00    Relevant Medications    amoxicillin-clavulanate (Augmentin) 500-125 mg tablet          In office urinalysis positive for leukocytes and nitrates.  Will initiate course of oral antibiotics and also send additional urine sample for culture.  Patient has tolerated Augmentin in the past as antibiotics are more restricted secondary to amiodarone use.  This should also help with patient's concerns for diverticulitis.  Patient encouraged to rest, stay hydrated and notify provider for any persistent/worsening urinary/bowel/infection concerns.  Routine follow-up with gastroenterology to be maintained.  Patient voices understanding and is in agreement to plan of care

## 2025-07-17 DIAGNOSIS — I10 BENIGN ESSENTIAL HYPERTENSION: ICD-10-CM

## 2025-07-17 RX ORDER — IRBESARTAN 300 MG/1
150 TABLET ORAL 2 TIMES DAILY
Qty: 90 TABLET | Refills: 3 | Status: SHIPPED | OUTPATIENT
Start: 2025-07-17 | End: 2026-07-17

## 2025-07-18 LAB — BACTERIA UR CULT: ABNORMAL

## 2025-08-01 ENCOUNTER — OFFICE VISIT (OUTPATIENT)
Dept: PRIMARY CARE | Facility: CLINIC | Age: 85
End: 2025-08-01
Payer: MEDICARE

## 2025-08-01 VITALS
DIASTOLIC BLOOD PRESSURE: 80 MMHG | OXYGEN SATURATION: 96 % | HEIGHT: 65 IN | BODY MASS INDEX: 34.66 KG/M2 | WEIGHT: 208 LBS | SYSTOLIC BLOOD PRESSURE: 144 MMHG | HEART RATE: 56 BPM

## 2025-08-01 DIAGNOSIS — R30.0 BURNING WITH URINATION: ICD-10-CM

## 2025-08-01 DIAGNOSIS — N30.00 ACUTE CYSTITIS WITHOUT HEMATURIA: Primary | ICD-10-CM

## 2025-08-01 LAB
POC APPEARANCE, URINE: CLEAR
POC BILIRUBIN, URINE: NEGATIVE
POC BLOOD, URINE: NEGATIVE
POC COLOR, URINE: COLORLESS
POC GLUCOSE, URINE: NEGATIVE MG/DL
POC KETONES, URINE: NEGATIVE MG/DL
POC LEUKOCYTES, URINE: ABNORMAL
POC NITRITE,URINE: NEGATIVE
POC PH, URINE: 7 PH
POC PROTEIN, URINE: NEGATIVE MG/DL
POC SPECIFIC GRAVITY, URINE: 1.02
POC UROBILINOGEN, URINE: 0.2 EU/DL

## 2025-08-01 PROCEDURE — 3077F SYST BP >= 140 MM HG: CPT | Performed by: NURSE PRACTITIONER

## 2025-08-01 PROCEDURE — 81002 URINALYSIS NONAUTO W/O SCOPE: CPT | Performed by: NURSE PRACTITIONER

## 2025-08-01 PROCEDURE — G2211 COMPLEX E/M VISIT ADD ON: HCPCS | Performed by: NURSE PRACTITIONER

## 2025-08-01 PROCEDURE — 99213 OFFICE O/P EST LOW 20 MIN: CPT | Performed by: NURSE PRACTITIONER

## 2025-08-01 PROCEDURE — 3079F DIAST BP 80-89 MM HG: CPT | Performed by: NURSE PRACTITIONER

## 2025-08-01 PROCEDURE — 1159F MED LIST DOCD IN RCRD: CPT | Performed by: NURSE PRACTITIONER

## 2025-08-01 RX ORDER — AMOXICILLIN AND CLAVULANATE POTASSIUM 875; 125 MG/1; MG/1
875 TABLET, FILM COATED ORAL 2 TIMES DAILY
Qty: 20 TABLET | Refills: 0 | Status: SHIPPED | OUTPATIENT
Start: 2025-08-01 | End: 2025-08-11

## 2025-08-01 ASSESSMENT — ENCOUNTER SYMPTOMS
VOMITING: 0
CHILLS: 0
FREQUENCY: 1
ABDOMINAL PAIN: 0
APPETITE CHANGE: 0
NAUSEA: 0
DIARRHEA: 1
DYSURIA: 1
ARTHRALGIAS: 1
FATIGUE: 0
FLANK PAIN: 0
MYALGIAS: 0
BACK PAIN: 1
DIZZINESS: 0
COUGH: 0
BRUISES/BLEEDS EASILY: 0
SHORTNESS OF BREATH: 0
JOINT SWELLING: 0
WOUND: 0
WEAKNESS: 0
SLEEP DISTURBANCE: 0
ADENOPATHY: 0
COLOR CHANGE: 0
PALPITATIONS: 0
TROUBLE SWALLOWING: 0
DYSURIA: 0
WHEEZING: 0
LIGHT-HEADEDNESS: 0
FEVER: 0

## 2025-08-01 NOTE — PROGRESS NOTES
Subjective   Patient ID: Danisha Marsh is a 85 y.o. female who presents for UTI (Returning symptoms ).    Patient seen today due to persistent urinary concerns.  Patient was originally diagnosed with a urinary tract infection last month.  At that time she also had concerns of another diverticulitis flareup due to loose stools/ abdominal pain.  Augmentin was prescribed but patient requested the lower dose due to sensitivities to antibiotics.  She states that she was starting to feel better but once her course of antibiotics was completed she developed increased urination and lower back pain.  She denies any fever, chills, pelvic pain/discomfort or hematuria.  Bowels appear to have been improved and patient states that she is follow-up in place with gastroenterology later this month. Patient reports that she is doing her best to stay hydrated as noted issues with increased fatigue or poor appetite. Medications reviewed.  No other acute concerns voiced at this time.      UTI   Associated symptoms include frequency and urgency. Pertinent negatives include no chills, flank pain, nausea or vomiting.   Difficulty Urinating   This is a recurrent problem. The current episode started yesterday. The problem occurs intermittently. The problem has been gradually worsening. The quality of the pain is described as aching. The pain is at a severity of 3/10. The pain is mild. There has been no fever. Associated symptoms include frequency and urgency. Pertinent negatives include no chills, flank pain, nausea or vomiting.       Current Outpatient Medications on File Prior to Visit   Medication Sig Dispense Refill    acetaminophen (Tylenol) 325 mg tablet Take 2 tablets (650 mg) by mouth every 6 hours if needed for moderate pain (4 - 6).      amiodarone (Pacerone) 200 mg tablet Take 1 tablet (200 mg) by mouth early in the morning..      amLODIPine (Norvasc) 5 mg tablet Take 1 tablet (5 mg) by mouth every 12 hours.       azelastine-fluticasone (Dymista) 137-50 mcg/spray nasal spray Administer 1 spray into each nostril 2 times a day. Use in each nostril as directed 1 each 2    Eliquis 5 mg tablet Take 1 tablet (5 mg) by mouth every 12 hours.      guaiFENesin (Robitussin) 100 mg/5 mL syrup Take 10 mL (200 mg) by mouth 3 times a day as needed for cough.      indapamide (Lozol) 2.5 mg tablet Take 1 tablet (2.5 mg) by mouth once daily. (Patient taking differently: Take 1 tablet (2.5 mg) by mouth once daily. Every other day) 90 tablet 3    irbesartan (Avapro) 300 mg tablet Take 0.5 tablets (150 mg) by mouth 2 times a day. 90 tablet 3    methIMAzole (Tapazole) 5 mg tablet 1 tab daily then 1 tab in the evening every other day 154 tablet 3    metoprolol tartrate (Lopressor) 50 mg tablet Take 1 tablet by mouth every 12 hours. 180 tablet 1    multivitamin tablet Take 1 tablet by mouth once daily.      nystatin (Mycostatin) cream Apply topically if needed (Applied to affected area twice daily as needed). 60 g 2    rosuvastatin (Crestor) 20 mg tablet Take 1 tablet (20 mg) by mouth once daily at bedtime.      [] amoxicillin-clavulanate (Augmentin) 500-125 mg tablet Take 1 tablet (500 mg of amoxicillin) by mouth 2 times a day for 10 days. 20 tablet 0     No current facility-administered medications on file prior to visit.       Past Medical History:   Diagnosis Date    Allergic     Arthritis     Blood in stool 2022    Cancer (Multi) 2007    Cataract     Disease of thyroid gland 2004    Diverticulitis 2022    Eczema     Heart disease stent 2018    Hypertension     Inflammatory bowel disease     Urinary tract infection     Varicella     Visual impairment         Past Surgical History:   Procedure Laterality Date    BACK SURGERY  1973    CARDIAC CATHETERIZATION      CARDIAC SURGERY  ablasion & pace maker for afib    COLON SURGERY      CORONARY STENT PLACEMENT  2018    HERNIA REPAIR  2007    JOINT REPLACEMENT  2019     "    Family History   Problem Relation Name Age of Onset    Cancer Mother Nereyda Yen     Colon cancer Mother Nereyda Yen     Heart disease Father Juanito Yen     Colon cancer Maternal Grandmother          Review of Systems   Constitutional:  Negative for appetite change, chills, fatigue and fever.   HENT:  Negative for congestion, dental problem, postnasal drip and trouble swallowing.         Reports chronic postnasal drip which is improving   Eyes:  Negative for visual disturbance.        Wears glasses.  Underwent bilateral cataract surgery   Respiratory:  Negative for cough, shortness of breath and wheezing.    Cardiovascular:  Negative for chest pain, palpitations and leg swelling.   Gastrointestinal:  Positive for diarrhea. Negative for abdominal pain, nausea and vomiting.        Positive for colon cancer history.     Genitourinary:  Positive for frequency and urgency. Negative for dysuria, flank pain and pelvic pain.        See HPI   Musculoskeletal:  Positive for arthralgias and back pain. Negative for gait problem, joint swelling and myalgias.        Positive for chronic bilateral hip and lower back pain.    Skin:  Negative for color change, pallor, rash and wound.        Positive skin cancer history.   Neurological:  Negative for dizziness, weakness and light-headedness.   Hematological:  Negative for adenopathy. Does not bruise/bleed easily.   Psychiatric/Behavioral:  Negative for sleep disturbance.    All other systems reviewed and are negative.      Objective   /80   Pulse 56   Ht 1.651 m (5' 5\")   Wt 94.3 kg (208 lb)   SpO2 96%   BMI 34.61 kg/m²     Physical Exam  Constitutional:       General: She is not in acute distress.     Appearance: Normal appearance. She is not toxic-appearing.   HENT:      Head: Normocephalic and atraumatic.      Right Ear: External ear normal.      Left Ear: External ear normal.      Nose: Nose normal.      Mouth/Throat:      Mouth: Mucous membranes are moist.      " Pharynx: Oropharynx is clear.      Comments: Missing just a few teeth.  Overall fair dentition    Eyes:      Extraocular Movements: Extraocular movements intact.      Conjunctiva/sclera: Conjunctivae normal.       Cardiovascular:      Rate and Rhythm: Normal rate and regular rhythm.      Pulses: Normal pulses.      Heart sounds: Murmur heard.   Pulmonary:      Effort: Pulmonary effort is normal.      Breath sounds: Normal breath sounds. No wheezing.   Abdominal:      General: There is no distension.      Palpations: Abdomen is soft.     Musculoskeletal:         General: No swelling.      Cervical back: Normal range of motion and neck supple.      Comments: Patient ambulates with a cane     Skin:     General: Skin is warm and dry.     Neurological:      General: No focal deficit present.      Mental Status: She is alert and oriented to person, place, and time. Mental status is at baseline.      Cranial Nerves: No cranial nerve deficit.      Motor: No weakness.     Psychiatric:         Mood and Affect: Mood normal.         Behavior: Behavior normal.         Thought Content: Thought content normal.         Judgment: Judgment normal.         Assessment/Plan   Problem List Items Addressed This Visit           ICD-10-CM    Burning with urination R30.0    Relevant Orders    POCT UA (nonautomated) manually resulted (Completed)    Urine Culture     Other Visit Diagnoses         Codes      Acute cystitis without hematuria    -  Primary N30.00    Relevant Medications    amoxicillin-clavulanate (Augmentin) 875-125 mg tablet          In office urinalysis positive for leukocytes.  Will initiate course of oral antibiotics and also send additional urine sample for culture.  Patient has tolerated Augmentin in the past as antibiotics are more restricted secondary to amiodarone use.   Patient encouraged to rest, stay hydrated and notify provider for any persistent/worsening urinary/bowel/infection concerns.  Routine follow-up with  gastroenterology to be maintained.  Patient voices understanding and is in agreement to plan of care

## 2025-08-03 LAB — BACTERIA UR CULT: ABNORMAL

## 2025-08-04 ENCOUNTER — RESULTS FOLLOW-UP (OUTPATIENT)
Dept: PRIMARY CARE | Facility: CLINIC | Age: 85
End: 2025-08-04
Payer: MEDICARE

## 2025-08-04 DIAGNOSIS — N30.00 ACUTE CYSTITIS WITHOUT HEMATURIA: Primary | ICD-10-CM

## 2025-08-04 LAB — BACTERIA UR CULT: ABNORMAL

## 2025-08-04 RX ORDER — SULFAMETHOXAZOLE AND TRIMETHOPRIM 800; 160 MG/1; MG/1
1 TABLET ORAL 2 TIMES DAILY
Qty: 10 TABLET | Refills: 0 | Status: SHIPPED | OUTPATIENT
Start: 2025-08-04 | End: 2025-08-04 | Stop reason: WASHOUT

## 2025-08-04 RX ORDER — CEFDINIR 300 MG/1
300 CAPSULE ORAL 2 TIMES DAILY
Qty: 14 CAPSULE | Refills: 0 | Status: SHIPPED | OUTPATIENT
Start: 2025-08-04 | End: 2025-08-07 | Stop reason: WASHOUT

## 2025-08-04 NOTE — TELEPHONE ENCOUNTER
----- Message from Julia Negro sent at 8/4/2025 11:26 AM EDT -----  Can you please update patient regarding most recent urinary sample?  Most recent urine culture is positive for E. coli.  Unfortunately the strain of bacteria is resistant to Augmentin so new   antibiotic will be sent to your pharmacy.  Please continue to rest, stay hydrated and notify office for any persistent/worsening urinary/infection concerns  ----- Message -----  From: Jasmin Noonan MA  Sent: 8/1/2025   4:07 PM EDT  To: KARLA Fay-CNP

## 2025-08-04 NOTE — TELEPHONE ENCOUNTER
Spoke with pt, omnicef was called in 2 bid for 7 days.  Pt exressed understanding and will call if symptoms do not improve or worsen.

## 2025-08-06 ENCOUNTER — APPOINTMENT (OUTPATIENT)
Dept: PHARMACY | Facility: HOSPITAL | Age: 85
End: 2025-08-06
Payer: MEDICARE

## 2025-08-07 ENCOUNTER — OFFICE VISIT (OUTPATIENT)
Dept: PRIMARY CARE | Facility: CLINIC | Age: 85
End: 2025-08-07
Payer: MEDICARE

## 2025-08-07 VITALS
OXYGEN SATURATION: 100 % | SYSTOLIC BLOOD PRESSURE: 190 MMHG | HEIGHT: 65 IN | DIASTOLIC BLOOD PRESSURE: 70 MMHG | WEIGHT: 209 LBS | HEART RATE: 60 BPM | BODY MASS INDEX: 34.82 KG/M2

## 2025-08-07 DIAGNOSIS — N30.00 ACUTE CYSTITIS WITHOUT HEMATURIA: Primary | ICD-10-CM

## 2025-08-07 DIAGNOSIS — I10 BENIGN ESSENTIAL HYPERTENSION: ICD-10-CM

## 2025-08-07 DIAGNOSIS — R30.0 BURNING WITH URINATION: ICD-10-CM

## 2025-08-07 LAB
POC APPEARANCE, URINE: CLEAR
POC BILIRUBIN, URINE: NEGATIVE
POC BLOOD, URINE: NEGATIVE
POC COLOR, URINE: YELLOW
POC GLUCOSE, URINE: NEGATIVE MG/DL
POC KETONES, URINE: NEGATIVE MG/DL
POC LEUKOCYTES, URINE: ABNORMAL
POC NITRITE,URINE: NEGATIVE
POC PH, URINE: 7 PH
POC PROTEIN, URINE: ABNORMAL MG/DL
POC SPECIFIC GRAVITY, URINE: 1.01
POC UROBILINOGEN, URINE: 0.2 EU/DL

## 2025-08-07 PROCEDURE — 3078F DIAST BP <80 MM HG: CPT | Performed by: NURSE PRACTITIONER

## 2025-08-07 PROCEDURE — 81003 URINALYSIS AUTO W/O SCOPE: CPT | Performed by: NURSE PRACTITIONER

## 2025-08-07 PROCEDURE — 1159F MED LIST DOCD IN RCRD: CPT | Performed by: NURSE PRACTITIONER

## 2025-08-07 PROCEDURE — 99213 OFFICE O/P EST LOW 20 MIN: CPT | Performed by: NURSE PRACTITIONER

## 2025-08-07 PROCEDURE — G2211 COMPLEX E/M VISIT ADD ON: HCPCS | Performed by: NURSE PRACTITIONER

## 2025-08-07 PROCEDURE — 3077F SYST BP >= 140 MM HG: CPT | Performed by: NURSE PRACTITIONER

## 2025-08-07 RX ORDER — NITROFURANTOIN 25; 75 MG/1; MG/1
100 CAPSULE ORAL 2 TIMES DAILY
Qty: 10 CAPSULE | Refills: 0 | Status: SHIPPED | OUTPATIENT
Start: 2025-08-07 | End: 2025-08-12

## 2025-08-07 ASSESSMENT — ENCOUNTER SYMPTOMS
SHORTNESS OF BREATH: 0
SWEATS: 0
ARTHRALGIAS: 1
FLANK PAIN: 0
DIZZINESS: 0
SLEEP DISTURBANCE: 0
NAUSEA: 1
ABDOMINAL PAIN: 0
DIARRHEA: 1
BRUISES/BLEEDS EASILY: 0
BACK PAIN: 1
NAUSEA: 0
APPETITE CHANGE: 0
WHEEZING: 0
FATIGUE: 0
MYALGIAS: 0
COLOR CHANGE: 0
CHILLS: 0
FEVER: 0
WOUND: 0
PALPITATIONS: 0
DYSURIA: 0
FREQUENCY: 1
VOMITING: 0
HEMATURIA: 0
DYSURIA: 1
WEAKNESS: 0
COUGH: 0
LIGHT-HEADEDNESS: 0
JOINT SWELLING: 0
ADENOPATHY: 0
TROUBLE SWALLOWING: 0

## 2025-08-07 NOTE — ASSESSMENT & PLAN NOTE
Patient is to continue to monitor her blood pressure at home and notify provider for any persistent issues with hyper or hypotension.  She is also to notify provider for any new cardiac concerns.  Patient to maintain routine follow-up with cardiology.  Clinical pharmacy order placed for additional recommendations regarding hypertension as she is already on multiple medications and has some bilateral lower extremity edema secondary to amlodipine

## 2025-08-07 NOTE — PROGRESS NOTES
Subjective   Patient ID: Danisha Marsh is a 85 y.o. female who presents for Establish Care (Pt states UTI for 2 weeks .. Pt states having gas since changing antibiotics on Monday.).    Patient seen today due to persistent urinary concerns.  Patient was originally diagnosed with a urinary tract infection last month.  At that time she also had concerns of another diverticulitis flareup due to loose stools/ abdominal pain.  Augmentin was prescribed but patient requested the lower dose due to sensitivities to antibiotics.  She states that she was starting to feel better but once her course of antibiotics was completed she developed increased urination and lower back pain.  She denies any fever, chills, pelvic pain/discomfort or hematuria.  She was prescribed Omnicef last week based upon urinary culture but patient states that she had to discontinue this medication due to severe abdominal gas pains.  She states that the has gone away since she discontinued the antibiotic and her bowels are now normal.  However, urinary tract symptoms persist.  Patient states that she is follow-up in place with gastroenterology later this month. Patient reports that she is doing her best to stay hydrated as noted issues with increased fatigue or poor appetite.  She is currently asymptomatic from elevated blood pressure reading and monitors that routinely at home.  Medications reviewed.  No other acute concerns voiced at this time.      UTI   Associated symptoms include frequency and urgency. Pertinent negatives include no chills, discharge, flank pain, hematuria, hesitancy, nausea, possible pregnancy, sweats or vomiting.   Difficulty Urinating   This is a recurrent problem. The current episode started yesterday. The problem occurs intermittently. The problem has been gradually worsening. The quality of the pain is described as aching. The pain is at a severity of 3/10. The pain is mild. There has been no fever. She is Not sexually  active. There is No history of pyelonephritis. Associated symptoms include frequency and urgency. Pertinent negatives include no chills, discharge, flank pain, hematuria, hesitancy, nausea, possible pregnancy, sweats or vomiting.       Current Outpatient Medications on File Prior to Visit   Medication Sig Dispense Refill    acetaminophen (Tylenol) 325 mg tablet Take 2 tablets (650 mg) by mouth every 6 hours if needed for moderate pain (4 - 6).      amiodarone (Pacerone) 200 mg tablet Take 1 tablet (200 mg) by mouth early in the morning..      amLODIPine (Norvasc) 5 mg tablet Take 1 tablet (5 mg) by mouth every 12 hours.      azelastine-fluticasone (Dymista) 137-50 mcg/spray nasal spray Administer 1 spray into each nostril 2 times a day. Use in each nostril as directed 1 each 2    Eliquis 5 mg tablet Take 1 tablet (5 mg) by mouth every 12 hours.      guaiFENesin (Robitussin) 100 mg/5 mL syrup Take 10 mL (200 mg) by mouth 3 times a day as needed for cough.      indapamide (Lozol) 2.5 mg tablet Take 1 tablet (2.5 mg) by mouth once daily. 90 tablet 3    irbesartan (Avapro) 300 mg tablet Take 0.5 tablets (150 mg) by mouth 2 times a day. 90 tablet 3    methIMAzole (Tapazole) 5 mg tablet 1 tab daily then 1 tab in the evening every other day 154 tablet 3    metoprolol tartrate (Lopressor) 50 mg tablet Take 1 tablet by mouth every 12 hours. 180 tablet 1    multivitamin tablet Take 1 tablet by mouth once daily.      nystatin (Mycostatin) cream Apply topically if needed (Applied to affected area twice daily as needed). 60 g 2    rosuvastatin (Crestor) 20 mg tablet Take 1 tablet (20 mg) by mouth once daily at bedtime.      [DISCONTINUED] amoxicillin-clavulanate (Augmentin) 875-125 mg tablet Take 1 tablet (875 mg of amoxicillin) by mouth 2 times a day for 10 days. 20 tablet 0    [DISCONTINUED] cefdinir (Omnicef) 300 mg capsule Take 1 capsule (300 mg) by mouth 2 times a day for 7 days. 14 capsule 0    [DISCONTINUED]  sulfamethoxazole-trimethoprim (Bactrim DS) 800-160 mg tablet Take 1 tablet by mouth 2 times a day for 5 days. 10 tablet 0     No current facility-administered medications on file prior to visit.       Past Medical History:   Diagnosis Date    Allergic     Arthritis     Blood in stool 12/27/2022    Cancer (Multi) 2007    Cataract 2024    Disease of thyroid gland 2004    Diverticulitis 12/27/2022    Eczema     Heart disease stent 2018    Hypertension     Inflammatory bowel disease     Urinary tract infection     Varicella     Visual impairment         Past Surgical History:   Procedure Laterality Date    BACK SURGERY  1973    CARDIAC CATHETERIZATION  2003    CARDIAC SURGERY  ablasion & pace maker for afib    COLON SURGERY  2007    CORONARY STENT PLACEMENT  2018    HERNIA REPAIR  2007    JOINT REPLACEMENT  2019        Family History   Problem Relation Name Age of Onset    Cancer Mother Nereyda Yen     Colon cancer Mother Nereyda Yen     Heart disease Father Juanito Yen     Colon cancer Maternal Grandmother          Review of Systems   Constitutional:  Negative for appetite change, chills, fatigue and fever.   HENT:  Negative for congestion, dental problem, postnasal drip and trouble swallowing.         Reports chronic postnasal drip which is improving   Eyes:  Negative for visual disturbance.        Wears glasses.  Underwent bilateral cataract surgery   Respiratory:  Negative for cough, shortness of breath and wheezing.    Cardiovascular:  Negative for chest pain, palpitations and leg swelling.   Gastrointestinal:  Positive for diarrhea. Negative for abdominal pain, nausea and vomiting.        Positive for colon cancer history.     Genitourinary:  Positive for frequency and urgency. Negative for dysuria, flank pain, hematuria, hesitancy and pelvic pain.        See HPI   Musculoskeletal:  Positive for arthralgias and back pain. Negative for gait problem, joint swelling and myalgias.        Positive for chronic  "bilateral hip and lower back pain.    Skin:  Negative for color change, pallor, rash and wound.        Positive skin cancer history.   Neurological:  Negative for dizziness, weakness and light-headedness.   Hematological:  Negative for adenopathy. Does not bruise/bleed easily.   Psychiatric/Behavioral:  Negative for sleep disturbance.    All other systems reviewed and are negative.      Objective   BP (!) 190/70 (BP Location: Right arm, Patient Position: Sitting)   Pulse 60   Ht 1.651 m (5' 5\")   Wt 94.8 kg (209 lb)   SpO2 100%   BMI 34.78 kg/m²     Physical Exam  Constitutional:       General: She is not in acute distress.     Appearance: Normal appearance. She is not toxic-appearing.   HENT:      Head: Normocephalic and atraumatic.      Right Ear: External ear normal.      Left Ear: External ear normal.      Nose: Nose normal.      Mouth/Throat:      Mouth: Mucous membranes are moist.      Pharynx: Oropharynx is clear.      Comments: Missing just a few teeth.  Overall fair dentition    Eyes:      Extraocular Movements: Extraocular movements intact.      Conjunctiva/sclera: Conjunctivae normal.       Cardiovascular:      Rate and Rhythm: Normal rate and regular rhythm.      Pulses: Normal pulses.      Heart sounds: Murmur heard.   Pulmonary:      Effort: Pulmonary effort is normal.      Breath sounds: Normal breath sounds. No wheezing.   Abdominal:      General: There is no distension.      Palpations: Abdomen is soft.     Musculoskeletal:         General: No swelling.      Cervical back: Normal range of motion and neck supple.      Comments: Patient ambulates with a cane     Skin:     General: Skin is warm and dry.     Neurological:      General: No focal deficit present.      Mental Status: She is alert and oriented to person, place, and time. Mental status is at baseline.      Cranial Nerves: No cranial nerve deficit.      Motor: No weakness.     Psychiatric:         Mood and Affect: Mood normal.         " Behavior: Behavior normal.         Thought Content: Thought content normal.         Judgment: Judgment normal.         Assessment/Plan   Problem List Items Addressed This Visit           ICD-10-CM    Benign essential hypertension I10    Patient is to continue to monitor her blood pressure at home and notify provider for any persistent issues with hyper or hypotension.  She is also to notify provider for any new cardiac concerns.  Patient to maintain routine follow-up with cardiology.  Clinical pharmacy order placed for additional recommendations regarding hypertension as she is already on multiple medications and has some bilateral lower extremity edema secondary to amlodipine         Burning with urination R30.0    Relevant Orders    Urine Culture    POCT UA Automated manually resulted (Completed)     Other Visit Diagnoses         Codes      Acute cystitis without hematuria    -  Primary N30.00    Relevant Medications    nitrofurantoin, macrocrystal-monohydrate, (Macrobid) 100 mg capsule          In office urinalysis positive for leukocytes.  Will initiate course of oral antibiotics and also send additional urine sample for culture.  Patient has tolerated Macrobid in the past as antibiotics are more restricted secondary to amiodarone use.   Patient encouraged to rest, stay hydrated and notify provider for any persistent/worsening urinary/bowel/infection concerns.  Routine follow-up with gastroenterology to be maintained.  Patient voices understanding and is in agreement to plan of care

## 2025-08-09 LAB — BACTERIA UR CULT: NORMAL

## 2025-08-10 ENCOUNTER — APPOINTMENT (OUTPATIENT)
Dept: CARDIOLOGY | Facility: HOSPITAL | Age: 85
End: 2025-08-10
Payer: MEDICARE

## 2025-08-10 ENCOUNTER — HOSPITAL ENCOUNTER (EMERGENCY)
Facility: HOSPITAL | Age: 85
Discharge: HOME | End: 2025-08-10
Payer: MEDICARE

## 2025-08-10 VITALS
HEART RATE: 75 BPM | WEIGHT: 208 LBS | RESPIRATION RATE: 18 BRPM | BODY MASS INDEX: 34.66 KG/M2 | HEIGHT: 65 IN | OXYGEN SATURATION: 99 % | SYSTOLIC BLOOD PRESSURE: 174 MMHG | DIASTOLIC BLOOD PRESSURE: 80 MMHG | TEMPERATURE: 97.2 F

## 2025-08-10 DIAGNOSIS — R10.9 LEFT FLANK PAIN: ICD-10-CM

## 2025-08-10 DIAGNOSIS — I10 HYPERTENSION, UNSPECIFIED TYPE: Primary | ICD-10-CM

## 2025-08-10 DIAGNOSIS — K92.1 HEMATOCHEZIA: ICD-10-CM

## 2025-08-10 LAB
ABO GROUP (TYPE) IN BLOOD: NORMAL
ALBUMIN SERPL BCP-MCNC: 4.3 G/DL (ref 3.4–5)
ALP SERPL-CCNC: 108 U/L (ref 33–136)
ALT SERPL W P-5'-P-CCNC: 27 U/L (ref 7–45)
ANION GAP SERPL CALC-SCNC: 13 MMOL/L (ref 10–20)
ANTIBODY SCREEN: NORMAL
APPEARANCE UR: CLEAR
AST SERPL W P-5'-P-CCNC: 23 U/L (ref 9–39)
BASOPHILS # BLD AUTO: 0.03 X10*3/UL (ref 0–0.1)
BASOPHILS NFR BLD AUTO: 0.4 %
BILIRUB DIRECT SERPL-MCNC: 0.1 MG/DL (ref 0–0.3)
BILIRUB SERPL-MCNC: 0.7 MG/DL (ref 0–1.2)
BILIRUB UR STRIP.AUTO-MCNC: NEGATIVE MG/DL
BUN SERPL-MCNC: 12 MG/DL (ref 6–23)
CALCIUM SERPL-MCNC: 9.4 MG/DL (ref 8.6–10.3)
CARDIAC TROPONIN I PNL SERPL HS: 6 NG/L (ref 0–13)
CHLORIDE SERPL-SCNC: 91 MMOL/L (ref 98–107)
CO2 SERPL-SCNC: 27 MMOL/L (ref 21–32)
COLOR UR: ABNORMAL
CREAT SERPL-MCNC: 0.74 MG/DL (ref 0.5–1.05)
EGFRCR SERPLBLD CKD-EPI 2021: 80 ML/MIN/1.73M*2
EOSINOPHIL # BLD AUTO: 0.09 X10*3/UL (ref 0–0.4)
EOSINOPHIL NFR BLD AUTO: 1.2 %
ERYTHROCYTE [DISTWIDTH] IN BLOOD BY AUTOMATED COUNT: 14.3 % (ref 11.5–14.5)
GLUCOSE SERPL-MCNC: 100 MG/DL (ref 74–99)
GLUCOSE UR STRIP.AUTO-MCNC: NORMAL MG/DL
HCT VFR BLD AUTO: 38.3 % (ref 36–46)
HGB BLD-MCNC: 13.4 G/DL (ref 12–16)
IMM GRANULOCYTES # BLD AUTO: 0.03 X10*3/UL (ref 0–0.5)
IMM GRANULOCYTES NFR BLD AUTO: 0.4 % (ref 0–0.9)
INR PPP: 1.2 (ref 0.9–1.1)
KETONES UR STRIP.AUTO-MCNC: NEGATIVE MG/DL
LACTATE SERPL-SCNC: 1 MMOL/L (ref 0.4–2)
LEUKOCYTE ESTERASE UR QL STRIP.AUTO: ABNORMAL
LIPASE SERPL-CCNC: 22 U/L (ref 9–82)
LYMPHOCYTES # BLD AUTO: 0.72 X10*3/UL (ref 0.8–3)
LYMPHOCYTES NFR BLD AUTO: 9.5 %
MAGNESIUM SERPL-MCNC: 1.78 MG/DL (ref 1.6–2.4)
MCH RBC QN AUTO: 32.2 PG (ref 26–34)
MCHC RBC AUTO-ENTMCNC: 35 G/DL (ref 32–36)
MCV RBC AUTO: 92 FL (ref 80–100)
MONOCYTES # BLD AUTO: 0.9 X10*3/UL (ref 0.05–0.8)
MONOCYTES NFR BLD AUTO: 11.8 %
NEUTROPHILS # BLD AUTO: 5.83 X10*3/UL (ref 1.6–5.5)
NEUTROPHILS NFR BLD AUTO: 76.7 %
NITRITE UR QL STRIP.AUTO: NEGATIVE
NRBC BLD-RTO: 0 /100 WBCS (ref 0–0)
PH UR STRIP.AUTO: 7 [PH]
PLATELET # BLD AUTO: 203 X10*3/UL (ref 150–450)
POTASSIUM SERPL-SCNC: 3.3 MMOL/L (ref 3.5–5.3)
PROT SERPL-MCNC: 7.6 G/DL (ref 6.4–8.2)
PROT UR STRIP.AUTO-MCNC: NEGATIVE MG/DL
PROTHROMBIN TIME: 13.7 SECONDS (ref 9.8–12.4)
RBC # BLD AUTO: 4.16 X10*6/UL (ref 4–5.2)
RBC # UR STRIP.AUTO: NEGATIVE MG/DL
RBC #/AREA URNS AUTO: NORMAL /HPF
RH FACTOR (ANTIGEN D): NORMAL
SODIUM SERPL-SCNC: 128 MMOL/L (ref 136–145)
SP GR UR STRIP.AUTO: 1.01
SQUAMOUS #/AREA URNS AUTO: NORMAL /HPF
UROBILINOGEN UR STRIP.AUTO-MCNC: NORMAL MG/DL
WBC # BLD AUTO: 7.6 X10*3/UL (ref 4.4–11.3)
WBC #/AREA URNS AUTO: NORMAL /HPF

## 2025-08-10 PROCEDURE — 83735 ASSAY OF MAGNESIUM: CPT | Performed by: PHYSICIAN ASSISTANT

## 2025-08-10 PROCEDURE — 83605 ASSAY OF LACTIC ACID: CPT | Performed by: PHYSICIAN ASSISTANT

## 2025-08-10 PROCEDURE — 86901 BLOOD TYPING SEROLOGIC RH(D): CPT | Performed by: PHYSICIAN ASSISTANT

## 2025-08-10 PROCEDURE — 85610 PROTHROMBIN TIME: CPT | Performed by: PHYSICIAN ASSISTANT

## 2025-08-10 PROCEDURE — 93005 ELECTROCARDIOGRAM TRACING: CPT

## 2025-08-10 PROCEDURE — 80048 BASIC METABOLIC PNL TOTAL CA: CPT | Performed by: PHYSICIAN ASSISTANT

## 2025-08-10 PROCEDURE — 84484 ASSAY OF TROPONIN QUANT: CPT | Performed by: PHYSICIAN ASSISTANT

## 2025-08-10 PROCEDURE — 99284 EMERGENCY DEPT VISIT MOD MDM: CPT

## 2025-08-10 PROCEDURE — 85025 COMPLETE CBC W/AUTO DIFF WBC: CPT | Performed by: PHYSICIAN ASSISTANT

## 2025-08-10 PROCEDURE — 83690 ASSAY OF LIPASE: CPT | Performed by: PHYSICIAN ASSISTANT

## 2025-08-10 PROCEDURE — 81003 URINALYSIS AUTO W/O SCOPE: CPT | Performed by: EMERGENCY MEDICINE

## 2025-08-10 PROCEDURE — 36415 COLL VENOUS BLD VENIPUNCTURE: CPT | Performed by: PHYSICIAN ASSISTANT

## 2025-08-10 PROCEDURE — 82248 BILIRUBIN DIRECT: CPT | Performed by: PHYSICIAN ASSISTANT

## 2025-08-10 ASSESSMENT — PAIN DESCRIPTION - DESCRIPTORS: DESCRIPTORS: THROBBING

## 2025-08-10 ASSESSMENT — PAIN DESCRIPTION - LOCATION: LOCATION: ABDOMEN

## 2025-08-10 ASSESSMENT — PAIN DESCRIPTION - ORIENTATION: ORIENTATION: LEFT

## 2025-08-10 ASSESSMENT — LIFESTYLE VARIABLES
HAVE PEOPLE ANNOYED YOU BY CRITICIZING YOUR DRINKING: NO
TOTAL SCORE: 0
HAVE YOU EVER FELT YOU SHOULD CUT DOWN ON YOUR DRINKING: NO
EVER HAD A DRINK FIRST THING IN THE MORNING TO STEADY YOUR NERVES TO GET RID OF A HANGOVER: NO
EVER FELT BAD OR GUILTY ABOUT YOUR DRINKING: NO

## 2025-08-10 ASSESSMENT — PAIN SCALES - GENERAL: PAINLEVEL_OUTOF10: 6

## 2025-08-10 ASSESSMENT — PAIN - FUNCTIONAL ASSESSMENT: PAIN_FUNCTIONAL_ASSESSMENT: 0-10

## 2025-08-10 ASSESSMENT — PAIN DESCRIPTION - PAIN TYPE: TYPE: ACUTE PAIN

## 2025-08-11 ENCOUNTER — RESULTS FOLLOW-UP (OUTPATIENT)
Dept: PRIMARY CARE | Facility: CLINIC | Age: 85
End: 2025-08-11
Payer: MEDICARE

## 2025-08-11 LAB
ATRIAL RATE: 55 BPM
P AXIS: -29 DEGREES
P OFFSET: 191 MS
P ONSET: 133 MS
PR INTERVAL: 176 MS
Q ONSET: 221 MS
QRS COUNT: 9 BEATS
QRS DURATION: 112 MS
QT INTERVAL: 488 MS
QTC CALCULATION(BAZETT): 466 MS
QTC FREDERICIA: 474 MS
R AXIS: -20 DEGREES
T AXIS: 13 DEGREES
T OFFSET: 465 MS
VENTRICULAR RATE: 55 BPM

## 2025-08-14 ENCOUNTER — APPOINTMENT (OUTPATIENT)
Dept: GASTROENTEROLOGY | Facility: CLINIC | Age: 85
End: 2025-08-14
Payer: MEDICARE

## 2025-08-14 VITALS
WEIGHT: 207 LBS | BODY MASS INDEX: 34.49 KG/M2 | OXYGEN SATURATION: 95 % | SYSTOLIC BLOOD PRESSURE: 183 MMHG | HEART RATE: 52 BPM | HEIGHT: 65 IN | DIASTOLIC BLOOD PRESSURE: 82 MMHG

## 2025-08-14 DIAGNOSIS — D12.6 TUBULAR ADENOMA OF COLON: ICD-10-CM

## 2025-08-14 DIAGNOSIS — K76.0 HEPATIC STEATOSIS: ICD-10-CM

## 2025-08-14 DIAGNOSIS — K64.0 GRADE I HEMORRHOIDS: Primary | ICD-10-CM

## 2025-08-14 PROCEDURE — 3079F DIAST BP 80-89 MM HG: CPT | Performed by: INTERNAL MEDICINE

## 2025-08-14 PROCEDURE — 99214 OFFICE O/P EST MOD 30 MIN: CPT | Performed by: INTERNAL MEDICINE

## 2025-08-14 PROCEDURE — 3077F SYST BP >= 140 MM HG: CPT | Performed by: INTERNAL MEDICINE

## 2025-08-14 PROCEDURE — 1159F MED LIST DOCD IN RCRD: CPT | Performed by: INTERNAL MEDICINE

## 2025-08-17 ASSESSMENT — ENCOUNTER SYMPTOMS: BLOOD IN STOOL: 1

## 2025-08-18 ENCOUNTER — APPOINTMENT (OUTPATIENT)
Dept: PHARMACY | Facility: HOSPITAL | Age: 85
End: 2025-08-18
Payer: MEDICARE

## 2025-08-18 DIAGNOSIS — I10 BENIGN ESSENTIAL HYPERTENSION: ICD-10-CM

## 2025-08-28 ENCOUNTER — OFFICE VISIT (OUTPATIENT)
Dept: PRIMARY CARE | Facility: CLINIC | Age: 85
End: 2025-08-28
Payer: MEDICARE

## 2025-08-28 VITALS
HEART RATE: 55 BPM | HEIGHT: 65 IN | SYSTOLIC BLOOD PRESSURE: 134 MMHG | WEIGHT: 207 LBS | BODY MASS INDEX: 34.49 KG/M2 | OXYGEN SATURATION: 94 % | DIASTOLIC BLOOD PRESSURE: 80 MMHG

## 2025-08-28 DIAGNOSIS — R31.9 HEMATURIA, UNSPECIFIED TYPE: Primary | ICD-10-CM

## 2025-08-28 DIAGNOSIS — N30.00 ACUTE CYSTITIS WITHOUT HEMATURIA: ICD-10-CM

## 2025-08-28 LAB
POC APPEARANCE, URINE: CLEAR
POC BILIRUBIN, URINE: NEGATIVE
POC BLOOD, URINE: ABNORMAL
POC COLOR, URINE: YELLOW
POC GLUCOSE, URINE: NEGATIVE MG/DL
POC KETONES, URINE: NEGATIVE MG/DL
POC LEUKOCYTES, URINE: NEGATIVE
POC NITRITE,URINE: NEGATIVE
POC PH, URINE: 6.5 PH
POC PROTEIN, URINE: NEGATIVE MG/DL
POC SPECIFIC GRAVITY, URINE: 1.01
POC UROBILINOGEN, URINE: 0.2 EU/DL

## 2025-08-28 PROCEDURE — 1036F TOBACCO NON-USER: CPT | Performed by: NURSE PRACTITIONER

## 2025-08-28 PROCEDURE — 3079F DIAST BP 80-89 MM HG: CPT | Performed by: NURSE PRACTITIONER

## 2025-08-28 PROCEDURE — 81002 URINALYSIS NONAUTO W/O SCOPE: CPT | Performed by: NURSE PRACTITIONER

## 2025-08-28 PROCEDURE — 1159F MED LIST DOCD IN RCRD: CPT | Performed by: NURSE PRACTITIONER

## 2025-08-28 PROCEDURE — 99213 OFFICE O/P EST LOW 20 MIN: CPT | Performed by: NURSE PRACTITIONER

## 2025-08-28 PROCEDURE — 3075F SYST BP GE 130 - 139MM HG: CPT | Performed by: NURSE PRACTITIONER

## 2025-08-28 RX ORDER — TRAMADOL HYDROCHLORIDE 50 MG/1
50 TABLET, FILM COATED ORAL NIGHTLY PRN
COMMUNITY
Start: 2025-08-26

## 2025-08-28 ASSESSMENT — ENCOUNTER SYMPTOMS
CHILLS: 0
VOMITING: 0
APPETITE CHANGE: 0
BACK PAIN: 1
SLEEP DISTURBANCE: 0
ADENOPATHY: 0
DIZZINESS: 0
JOINT SWELLING: 0
COLOR CHANGE: 0
SHORTNESS OF BREATH: 0
DYSURIA: 1
FATIGUE: 0
WOUND: 0
WHEEZING: 0
ARTHRALGIAS: 1
WEAKNESS: 0
SWEATS: 0
COUGH: 0
BRUISES/BLEEDS EASILY: 0
LIGHT-HEADEDNESS: 0
TROUBLE SWALLOWING: 0
ABDOMINAL PAIN: 0
DIARRHEA: 0
NAUSEA: 0
FREQUENCY: 1
FEVER: 0
HEMATURIA: 0
PALPITATIONS: 0
MYALGIAS: 0
FLANK PAIN: 0

## 2025-08-30 LAB — BACTERIA UR CULT: NORMAL

## 2025-11-11 ENCOUNTER — APPOINTMENT (OUTPATIENT)
Dept: PRIMARY CARE | Facility: CLINIC | Age: 85
End: 2025-11-11
Payer: MEDICARE

## 2025-12-03 ENCOUNTER — APPOINTMENT (OUTPATIENT)
Dept: ENDOCRINOLOGY | Facility: CLINIC | Age: 85
End: 2025-12-03
Payer: MEDICARE